# Patient Record
Sex: FEMALE | Race: WHITE | Employment: STUDENT | URBAN - METROPOLITAN AREA
[De-identification: names, ages, dates, MRNs, and addresses within clinical notes are randomized per-mention and may not be internally consistent; named-entity substitution may affect disease eponyms.]

---
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
## 2020-01-06 ENCOUNTER — OFFICE VISIT (OUTPATIENT)
Dept: FAMILY MEDICINE CLINIC | Age: 20
End: 2020-01-06
Payer: MEDICARE

## 2020-01-06 VITALS
HEIGHT: 61 IN | BODY MASS INDEX: 42.1 KG/M2 | DIASTOLIC BLOOD PRESSURE: 88 MMHG | SYSTOLIC BLOOD PRESSURE: 138 MMHG | HEART RATE: 80 BPM | OXYGEN SATURATION: 100 % | WEIGHT: 223 LBS

## 2020-01-06 PROCEDURE — G8427 DOCREV CUR MEDS BY ELIG CLIN: HCPCS | Performed by: FAMILY MEDICINE

## 2020-01-06 PROCEDURE — 1036F TOBACCO NON-USER: CPT | Performed by: FAMILY MEDICINE

## 2020-01-06 PROCEDURE — G8417 CALC BMI ABV UP PARAM F/U: HCPCS | Performed by: FAMILY MEDICINE

## 2020-01-06 PROCEDURE — 99213 OFFICE O/P EST LOW 20 MIN: CPT | Performed by: FAMILY MEDICINE

## 2020-01-06 PROCEDURE — G8484 FLU IMMUNIZE NO ADMIN: HCPCS | Performed by: FAMILY MEDICINE

## 2020-01-06 ASSESSMENT — ENCOUNTER SYMPTOMS
RHINORRHEA: 0
CONSTIPATION: 0
CHEST TIGHTNESS: 0
WHEEZING: 0
NAUSEA: 0
ABDOMINAL DISTENTION: 0
SINUS PRESSURE: 0
PHOTOPHOBIA: 0
ABDOMINAL PAIN: 0
SHORTNESS OF BREATH: 0

## 2020-01-06 ASSESSMENT — PATIENT HEALTH QUESTIONNAIRE - PHQ9
SUM OF ALL RESPONSES TO PHQ QUESTIONS 1-9: 0
2. FEELING DOWN, DEPRESSED OR HOPELESS: 0
SUM OF ALL RESPONSES TO PHQ QUESTIONS 1-9: 0
1. LITTLE INTEREST OR PLEASURE IN DOING THINGS: 0
SUM OF ALL RESPONSES TO PHQ9 QUESTIONS 1 & 2: 0

## 2020-01-06 NOTE — PROGRESS NOTES
certain contexts, culture may be required to meet applicable laws and regulations for   diagnosis of C. trachomatis and N. gonorrhoeae infections. Per 2014  CDC recommendations, this test does not include confirmation of positive results   by an alternative nucleic acid target.            Most recent labs reviewed:     Lab Results   Component Value Date    WBC 9.0 07/17/2019    HGB 13.0 07/17/2019    HCT 39.7 07/17/2019    MCV 82.7 07/17/2019     (H) 07/17/2019       @BRIEFLAB(NA,K,CL,CO2,BUN,CREATININE,GLUCOSE,CALCIUM)@     Lab Results   Component Value Date    ALT 22 07/17/2019    AST 19 07/17/2019    ALKPHOS 75 07/17/2019    BILITOT 0.32 07/17/2019       Lab Results   Component Value Date    TSH 1.24 07/17/2019       Lab Results   Component Value Date    CHOL 156 07/17/2019    CHOL 179 06/25/2013     Lab Results   Component Value Date    TRIG 46 07/17/2019    TRIG 55 06/25/2013     Lab Results   Component Value Date    HDL 52 07/17/2019    HDL 69 06/25/2013     Lab Results   Component Value Date    LDLCHOLESTEROL 95 07/17/2019    LDLCHOLESTEROL 99 06/25/2013     Lab Results   Component Value Date    VLDL NOT REPORTED 07/17/2019    VLDL NOT REPORTED 06/25/2013     Lab Results   Component Value Date    CHOLHDLRATIO 3.0 07/17/2019    CHOLHDLRATIO 2.6 06/25/2013       No results found for: LABA1C    Lab Results   Component Value Date    [id number] 344 10/17/2017       Lab Results   Component Value Date    FOLATE 17.0 10/17/2017       Lab Results   Component Value Date    FERRITIN 62 10/17/2017    FERRITIN 63 10/17/2017       Lab Results   Component Value Date    VITD25 19.3 (L) 10/17/2017             Current Outpatient Medications   Medication Sig Dispense Refill    magnesium oxide (MAG-OX) 250 MG TABS tablet Take 1 tablet by mouth every evening 30 tablet 6    Turmeric 500 MG TABS Take 450 mg by mouth daily 30 tablet 6    coenzyme Q10 100 MG CAPS capsule Take 2 capsules by mouth every morning 60 capsule 6  topiramate ER (TROKENDI XR) 100 MG CP24 Take 1 capsule by mouth daily 30 capsule 6    Vitamin D (CHOLECALCIFEROL) 25 MCG (1000 UT) TABS tablet Take 1 tablet by mouth daily 30 tablet 6    FLUoxetine (PROZAC) 20 MG capsule Take 1 capsule by mouth daily 60 capsule 2    montelukast (SINGULAIR) 10 MG tablet Take 1 tablet by mouth daily 30 tablet 3    vitamin C (ASCORBIC ACID) 500 MG tablet Take 1 tablet by mouth daily 30 tablet 5    albuterol sulfate HFA (VENTOLIN HFA) 108 (90 Base) MCG/ACT inhaler Inhale 2 puffs into the lungs every 6 hours as needed for Wheezing . Take two prophylactic puffs 5-15 min prior to exercise. 1 Inhaler 1    ondansetron (ZOFRAN) 4 MG tablet TAKE 1 TABLET BY MOUTH DAILY AS NEEDED FOR NAUSEA OR VOMITING 30 tablet 0    cyproheptadine (PERIACTIN) 4 MG tablet TAKE 1 TABLET BY MOUTH EVERY NIGHT 30 tablet 0    fluticasone (FLONASE) 50 MCG/ACT nasal spray SHAKE LIQUID AND USE 2 SPRAYS IN EACH NOSTRIL EVERY DAY 1 Bottle 5    fludrocortisone (FLORINEF) 0.1 MG tablet Take 0.1 mg by mouth      ibuprofen (ADVIL;MOTRIN) 600 MG tablet Take 1 tablet by mouth every 8 hours as needed for Pain 100 tablet 1    Multiple Vitamins-Minerals (CENTROVITE) TABS Take 1 tablet by mouth daily 30 tablet 11     No current facility-administered medications for this visit.               Social History     Socioeconomic History    Marital status: Single     Spouse name: Not on file    Number of children: Not on file    Years of education: Not on file    Highest education level: Not on file   Occupational History    Not on file   Social Needs    Financial resource strain: Not on file    Food insecurity:     Worry: Not on file     Inability: Not on file    Transportation needs:     Medical: Not on file     Non-medical: Not on file   Tobacco Use    Smoking status: Never Smoker    Smokeless tobacco: Never Used   Substance and Sexual Activity    Alcohol use: No    Drug use: No    Sexual activity: Not Currently   Lifestyle    Physical activity:     Days per week: Not on file     Minutes per session: Not on file    Stress: Not on file   Relationships    Social connections:     Talks on phone: Not on file     Gets together: Not on file     Attends [employer] service: Not on file     Active member of club or organization: Not on file     Attends meetings of clubs or organizations: Not on file     Relationship status: Not on file    Intimate partner violence:     Fear of current or ex partner: Not on file     Emotionally abused: Not on file     Physically abused: Not on file     Forced sexual activity: Not on file   Other Topics Concern    Not on file   Social History Narrative    Not on file     Counseling given: No        Family History   Problem Relation Age of Onset    Asthma Sister     Diabetes Maternal Grandmother     High Blood Pressure Maternal Grandmother     Heart Disease Maternal Grandmother     Asthma Maternal Grandmother     Other Maternal Grandmother         GERSON with CPAP    Asthma Brother              -rest of complaints with corresponding details per ROS    The patient's past medical, surgical, social, and family history as well as her current medications and allergies were reviewed as documented intoday's encounter. Review of Systems   Constitutional: Negative for activity change, appetite change, fever and unexpected weight change. HENT: Negative for congestion, ear pain, postnasal drip, rhinorrhea and sinus pressure. Eyes: Negative for photophobia and visual disturbance. Respiratory: Negative for chest tightness, shortness of breath and wheezing. Cardiovascular: Negative for chest pain, palpitations and leg swelling. Gastrointestinal: Negative for abdominal distention, abdominal pain, constipation and nausea. Endocrine: Negative for polyphagia and polyuria. Genitourinary: Negative for difficulty urinating and frequency. Neurological: Negative for weakness. Psychiatric/Behavioral: Negative for agitation, behavioral problems and decreased concentration. The patient is not nervous/anxious. Physical Exam    PHYSICAL EXAM:   VITALS:   Vitals:    01/06/20 0820   BP: 138/88   Pulse: 80   SpO2: 100%     GENERAL:  Patient is a well-developed, well-nourished female  in no acute distress, alert and oriented x3, appropriate and pleasant conversation. HEAD: Normocephalic, atraumatic. EYES: Pupils equal, round and reactive to light and accommodation, extraocular   movements intact. ENT: Moist mucous membranes. No erythema is noted. NECK: Supple. No masses. No lymphadenopathy. CARDIOVASCULAR: Regular rate and rhythm. PULMONARY: Lungs are clear to auscultation bilaterally. ABDOMEN: Soft, nontender, nondistended. Positive bowel sounds. MUSCULOSKELETAL: Strength 5/5 bilaterally in all extremities. No tenderness to   palpation of the ribs, long bones, or spine. NEUROLOGIC: Cranial nerves II through XII grossly intact. No focal deficits are noted. ASSESSMENT AND PLAN      1. Neurocardiogenic syncope  -Stable follows with cardiologist  2. Anxiety  Stable continue Prozac    3. Nonintractable episodic headache, unspecified headache type  Stable continue to follow with neurologist    4. Class 2 obesity due to excess calories without serious comorbidity with body mass index (BMI) of 37.0 to 37.9 in adult  Continue to work on diet and exercise. No orders of the defined types were placed in this encounter. There are no discontinued medications. Romario Salas received counseling on the following healthy behaviors: nutrition, exercise and medication adherence  Reviewed prior labs and health maintenance  Continue current medications, diet and exercise. Discussed use, benefit, and side effects of prescribed medications. Barriers to medication compliance addressed.    Patient given educational materials - see patient instructions  Was a self-tracking

## 2020-01-06 NOTE — PROGRESS NOTES
Visit Information    Have you changed or started any medications since your last visit including any over-the-counter medicines, vitamins, or herbal medicines? no   Have you stopped taking any of your medications? Is so, why? -  no  Are you having any side effects from any of your medications? - no    Have you seen any other physician or provider since your last visit?  no   Have you had any other diagnostic tests since your last visit?  no   Have you been seen in the emergency room and/or had an admission in a hospital since we last saw you?  no   Have you had your routine dental cleaning in the past 6 months?  no     Do you have an active MyChart account? If no, what is the barrier?   Yes    Patient Care Team:  Alli Dumont MD as PCP - General (Family Medicine)  Alli Dumont MD as PCP - [facility]    Medical History Review  Past Medical, Family, and Social History reviewed and does contribute to the patient presenting condition    Health Maintenance   Topic Date Due    Chlamydia screen  07/17/2020    DTaP/Tdap/Td vaccine (7 - Td) 08/03/2020    HPV vaccine  Completed    Varicella Vaccine  Completed    Meningococcal (ACWY) Vaccine  Completed    Flu vaccine  Completed    Pneumococcal 0-64 years Vaccine  Completed    HIV screen  Completed

## 2020-07-27 ENCOUNTER — HOSPITAL ENCOUNTER (OUTPATIENT)
Age: 20
Setting detail: SPECIMEN
Discharge: HOME | End: 2020-07-27
Payer: MEDICARE

## 2020-07-27 ENCOUNTER — OFFICE VISIT (OUTPATIENT)
Dept: OBGYN CLINIC | Age: 20
End: 2020-07-27
Payer: MEDICARE

## 2020-07-27 VITALS
HEART RATE: 87 BPM | DIASTOLIC BLOOD PRESSURE: 90 MMHG | HEIGHT: 61 IN | SYSTOLIC BLOOD PRESSURE: 125 MMHG | TEMPERATURE: 98.3 F | WEIGHT: 233.6 LBS | BODY MASS INDEX: 44.1 KG/M2 | RESPIRATION RATE: 16 BRPM

## 2020-07-27 LAB
DIRECT EXAM: ABNORMAL
Lab: ABNORMAL
SPECIMEN DESCRIPTION: ABNORMAL

## 2020-07-27 PROCEDURE — 1036F TOBACCO NON-USER: CPT | Performed by: CLINICAL NURSE SPECIALIST

## 2020-07-27 PROCEDURE — 99203 OFFICE O/P NEW LOW 30 MIN: CPT | Performed by: CLINICAL NURSE SPECIALIST

## 2020-07-27 PROCEDURE — G8427 DOCREV CUR MEDS BY ELIG CLIN: HCPCS | Performed by: CLINICAL NURSE SPECIALIST

## 2020-07-27 PROCEDURE — G8417 CALC BMI ABV UP PARAM F/U: HCPCS | Performed by: CLINICAL NURSE SPECIALIST

## 2020-07-27 ASSESSMENT — ENCOUNTER SYMPTOMS
ALLERGIC/IMMUNOLOGIC NEGATIVE: 1
RESPIRATORY NEGATIVE: 1
EYES NEGATIVE: 1
GASTROINTESTINAL NEGATIVE: 1

## 2020-07-27 NOTE — PROGRESS NOTES
Subjective:      Patient ID:  Kerline Tompkins is a 21 y.o. female who presents for   Chief Complaint   Patient presents with    Pelvic Pain     Patient reports right lower quadrant pain that is stabbing and occurs at random times       HPI     New patient is a 20 yo female who presents for pelvic pain that has been ongoing for several years. Patient reports that the pelvic pain has worsened over the past 1 month described sharp stabbing and intermittent. Patient also reports that she has irregular menses and she has not had a cycle for the past 45 days. Patient states that she can skip up to 3 months at a time which has been her pattern since she began menarche. Patient reports that when she does have a menses the flow is described as moderate, and states she rarely has cramping with menses. Review of Systems   Constitutional: Negative for chills and fever. HENT: Negative. Eyes: Negative. Respiratory: Negative. Cardiovascular: Negative. Gastrointestinal: Negative. Endocrine: Negative. Genitourinary: Positive for menstrual problem (reports that she skips menses and sometimes as long as 115 days) and pelvic pain (for the past several years and over the last month it has worserened described as sharp and stabbing). Negative for dysuria and vaginal discharge. Musculoskeletal: Negative. Skin: Negative. Allergic/Immunologic: Negative. Neurological: Negative. Hematological: Negative. Psychiatric/Behavioral: Negative. BP (!) 125/90 (Site: Right Upper Arm, Position: Sitting)   Pulse 87   Temp 98.3 °F (36.8 °C) (Temporal)   Resp 16   Ht 5' 1\" (1.549 m)   Wt 233 lb 9.6 oz (106 kg)   LMP 05/09/2020 (Exact Date)   BMI 44.14 kg/m²    Patient's last menstrual period was 05/09/2020 (exact date). Objective:   Physical Exam  Vitals signs reviewed. Constitutional:       Appearance: She is well-developed. HENT:      Head: Normocephalic and atraumatic.    Eyes: Conjunctiva/sclera: Conjunctivae normal.   Neck:      Musculoskeletal: Normal range of motion and neck supple. Cardiovascular:      Rate and Rhythm: Normal rate and regular rhythm. Pulmonary:      Effort: Pulmonary effort is normal.      Breath sounds: Normal breath sounds. Abdominal:      General: Bowel sounds are normal.   Genitourinary:     Vagina: Vaginal discharge (white discharge) present. Comments: Discomfort with bimanual exam on the right  Musculoskeletal: Normal range of motion. Skin:     General: Skin is warm and dry. Neurological:      Mental Status: She is oriented to person, place, and time. Psychiatric:         Behavior: Behavior normal.         Thought Content: Thought content normal.         Judgment: Judgment normal.         Assessment:      Diagnosis Orders   1. Irregular menses  Glucose, Fasting    Insulin, total    Follicle Stimulating Hormone    Luteinizing Hormone    TSH    Prolactin    T4, Free   2. Pelvic pain in female  US NON OB TRANSVAGINAL    VAGINITIS DNA PROBE   3. Birth control counseling             Plan:    discussed with patient birth control options and recommended that she follow up with her neurologist she is unclear of her migraine status with or without Aura. Suspect PCO     Return in about 2 weeks (around 8/10/2020) for after all labs and US are complete. Patient was seen with total face to face time of 30 minutes. More than 50% of this visit was on counseling andeducation regarding the problems listed below and her options. She was also counseled on her preventative health maintenance recommendations and follow-up.     Electronically signed by: Soledad Zamora CNP

## 2020-07-28 RX ORDER — METRONIDAZOLE 500 MG/1
500 TABLET ORAL 2 TIMES DAILY
Qty: 14 TABLET | Refills: 0 | Status: SHIPPED | OUTPATIENT
Start: 2020-07-28 | End: 2020-08-04

## 2020-07-28 RX ORDER — FLUCONAZOLE 100 MG/1
100 TABLET ORAL DAILY
Qty: 7 TABLET | Refills: 0 | Status: SHIPPED | OUTPATIENT
Start: 2020-07-28 | End: 2020-08-04

## 2020-07-29 ENCOUNTER — TELEMEDICINE (OUTPATIENT)
Dept: FAMILY MEDICINE CLINIC | Age: 20
End: 2020-07-29
Payer: MEDICARE

## 2020-07-29 PROBLEM — E66.01 MORBIDLY OBESE (HCC): Status: ACTIVE | Noted: 2019-07-17

## 2020-07-29 PROCEDURE — 99213 OFFICE O/P EST LOW 20 MIN: CPT | Performed by: FAMILY MEDICINE

## 2020-07-29 PROCEDURE — G8427 DOCREV CUR MEDS BY ELIG CLIN: HCPCS | Performed by: FAMILY MEDICINE

## 2020-07-29 ASSESSMENT — ENCOUNTER SYMPTOMS
BACK PAIN: 0
CONSTIPATION: 0
BLOOD IN STOOL: 0
RHINORRHEA: 0
ABDOMINAL PAIN: 0
COUGH: 0
WHEEZING: 0
SHORTNESS OF BREATH: 0

## 2020-07-29 NOTE — PROGRESS NOTES
2020    TELEHEALTH EVALUATION -- Audio/Visual (During UWLTN-23 public health emergency)    HPI: Patient is here for follow-up for obesity and anxiety. Patient is on Prozac reports anxiety stable. Patient wants to see psychiatrist for ADHD, reports she had symptoms of decreased focusing, not able to concentrate, and her mind always goes from one thing to another. Patient wants to get evaluated for ADHD. Reports she had this problem since birth but never was seen by psychiatrist.      Obesity patient has gained couple of pounds because she is not working out. Patient is asking to do covid   Test as she has to go back to college. Headaches stable follows with neurologist  Sam Escalante (:  2000) has requested an audio/video evaluation for the following concern(s):    Chief Complaint   Patient presents with    Anxiety         Review of Systems   Constitutional: Positive for unexpected weight change. Negative for activity change, appetite change and fatigue. HENT: Negative for congestion, postnasal drip and rhinorrhea. Eyes: Negative for visual disturbance. Respiratory: Negative for cough, shortness of breath and wheezing. Cardiovascular: Negative for chest pain, palpitations and leg swelling. Gastrointestinal: Negative for abdominal pain, blood in stool and constipation. Genitourinary: Positive for menstrual problem. Negative for difficulty urinating, flank pain, urgency and vaginal pain. Musculoskeletal: Negative for arthralgias, back pain, gait problem and myalgias. Neurological: Negative for speech difficulty, weakness, light-headedness and numbness. Psychiatric/Behavioral: Positive for behavioral problems, decreased concentration and dysphoric mood. Negative for confusion, sleep disturbance and suicidal ideas. The patient is nervous/anxious and is hyperactive. Prior to Visit Medications    Medication Sig Taking?  Authorizing Provider   metroNIDAZOLE (FLAGYL) 500 MG tablet Take 1 tablet by mouth 2 times daily for 7 days Yes LAURO Meek CNP   fluconazole (DIFLUCAN) 100 MG tablet Take 1 tablet by mouth daily for 7 days Yes LAURO Meek CNP   magnesium oxide (MAG-OX) 250 MG TABS tablet Take 1 tablet by mouth every evening Yes Patricia Navas MD   Turmeric 500 MG TABS Take 450 mg by mouth daily Yes Patricia Navas MD   coenzyme Q10 100 MG CAPS capsule Take 2 capsules by mouth every morning Yes Patricia Navas MD   topiramate ER (TROKENDI XR) 100 MG CP24 Take 1 capsule by mouth daily Yes Patricia Navas MD   Vitamin D (CHOLECALCIFEROL) 25 MCG (1000 UT) TABS tablet Take 1 tablet by mouth daily Yes Patricia Navas MD   FLUoxetine (PROZAC) 20 MG capsule Take 1 capsule by mouth daily Yes Wale Porras MD   montelukast (SINGULAIR) 10 MG tablet Take 1 tablet by mouth daily Yes Arthur Garg MD   vitamin C (ASCORBIC ACID) 500 MG tablet Take 1 tablet by mouth daily Yes Patricia Navas MD   albuterol sulfate HFA (VENTOLIN HFA) 108 (90 Base) MCG/ACT inhaler Inhale 2 puffs into the lungs every 6 hours as needed for Wheezing . Take two prophylactic puffs 5-15 min prior to exercise.  Yes Sophy Obrien MD   ondansetron (ZOFRAN) 4 MG tablet TAKE 1 TABLET BY MOUTH DAILY AS NEEDED FOR NAUSEA OR VOMITING Yes Sophy Obrien MD   cyproheptadine (PERIACTIN) 4 MG tablet TAKE 1 TABLET BY MOUTH EVERY NIGHT Yes Sophy Obrien MD   fluticasone (FLONASE) 50 MCG/ACT nasal spray SHAKE LIQUID AND USE 2 SPRAYS IN EACH NOSTRIL EVERY DAY Yes Sophy Obrien MD   fludrocortisone (FLORINEF) 0.1 MG tablet Take 0.1 mg by mouth Yes Frida Salazar MD   ibuprofen (ADVIL;MOTRIN) 600 MG tablet Take 1 tablet by mouth every 8 hours as needed for Pain Yes Sophy Obrien MD   Multiple Vitamins-Minerals (CENTROVITE) TABS Take 1 tablet by mouth daily  Sophy Obrien MD       Social History     Tobacco Use    Smoking status: Never Smoker    Smokeless tobacco: Never Used Substance Use Topics    Alcohol use: No    Drug use: No        Allergies   Allergen Reactions    Triamterene Itching     Redness     ,   Past Medical History:   Diagnosis Date    Anxiety 7/17/2019    Class 2 obesity due to excess calories without serious comorbidity with body mass index (BMI) of 37.0 to 37.9 in adult 7/17/2019    Headache(784.0)     Meniere syndrome     Mild intermittent asthma 8/17/2017    Vision loss 3/29/2016   ,   Past Surgical History:   Procedure Laterality Date    TYMPANOSTOMY TUBE PLACEMENT Bilateral 8/13   ,   Social History     Tobacco Use    Smoking status: Never Smoker    Smokeless tobacco: Never Used   Substance Use Topics    Alcohol use: No    Drug use: No   ,   Family History   Problem Relation Age of Onset    Asthma Sister     Diabetes Maternal Grandmother     High Blood Pressure Maternal Grandmother     Heart Disease Maternal Grandmother     Asthma Maternal Grandmother     Other Maternal Grandmother         GERSON with CPAP    Asthma Brother    ,   Immunization History   Administered Date(s) Administered    DTaP 2000, 2000, 2000, 05/21/2001, 04/01/2004    HPV Quadrivalent (Gardasil) 06/25/2013, 12/26/2013, 07/25/2014, 10/24/2014, 03/16/2015    Hepatitis A 08/28/2007, 11/12/2008    Hepatitis B 2000, 2000, 05/24/2001    Hepatitis B (Recombivax HB) 07/24/2015    Hib PRP-OMP (PedvaxHIB) 2000, 04/01/2004    Hib, unspecified 2000, 2000, 05/24/2001, 04/01/2004    Influenza Virus Vaccine 10/24/2002, 11/12/2008, 10/08/2009, 12/03/2010, 12/26/2013, 10/24/2014, 12/21/2015, 08/13/2019    Influenza, Quadv, IM, PF (6 mo and older Fluzone, Flulaval, Fluarix, and 3 yrs and older Afluria) 12/02/2016, 11/20/2017    MMR 05/24/2001, 10/26/2004    Meningococcal B, OMV (Bexsero) 11/20/2017, 01/26/2018    Meningococcal MCV4P (Menactra) 07/25/2014, 07/22/2016    Pneumococcal Conjugate 13-valent (Lorence Guard) 07/22/2016    Pneumococcal Conjugate 7-valent (Prevnar7) 08/30/2001, 10/26/2004    Pneumococcal Polysaccharide (Ubylxycwi00) 12/02/2016    Polio IPV (IPOL) 2000, 2000, 2000, 04/01/2004    Tdap (Boostrix, Adacel) 08/03/2010    Varicella (Varivax) 05/24/2001, 05/24/2002, 08/28/2007   ,   Health Maintenance   Topic Date Due    Chlamydia screen  07/17/2020    DTaP/Tdap/Td vaccine (7 - Td) 08/03/2020    Flu vaccine (1) 09/01/2020    Hepatitis A vaccine  Completed    Hepatitis B vaccine  Completed    Hib vaccine  Completed    HPV vaccine  Completed    Varicella vaccine  Completed    Meningococcal (ACWY) vaccine  Completed    Pneumococcal 0-64 years Vaccine  Completed    HIV screen  Completed       PHYSICAL EXAMINATION:  [ INSTRUCTIONS:  \"[x]\" Indicates a positive item  \"[]\" Indicates a negative item  -- DELETE ALL ITEMS NOT EXAMINED]  Vital Signs: (As obtained by patient/caregiver or practitioner observation)    Blood pressure-  Heart rate-    Respiratory rate-    Temperature-  Pulse oximetry-     Constitutional: [x] Appears well-developed and well-nourished [x] No apparent distress      [] Abnormal-   Mental status  [x] Alert and awake  [x] Oriented to person/place/time [x]Able to follow commands      Eyes:  EOM    [x]  Normal  [] Abnormal-  Sclera  [x]  Normal  [] Abnormal -         Discharge [x]  None visible  [] Abnormal -    HENT:   [x] Normocephalic, atraumatic.   [x] Abnormal   [x] Mouth/Throat: Mucous membranes are moist.     External Ears [x] Normal  [] Abnormal-     Neck: [x] No visualized mass     Pulmonary/Chest: [x] Respiratory effort normal.  [x] No visualized signs of difficulty breathing or respiratory distress        [] Abnormal-      Musculoskeletal:   [x] Normal gait with no signs of ataxia         [x] Normal range of motion of neck        [] Abnormal-       Neurological:        [x] No Facial Asymmetry (Cranial nerve 7 motor function) (limited exam to video visit)          [x] No gaze palsy        [] Abnormal-         Skin:        [x] No significant exanthematous lesions or discoloration noted on facial skin         [] Abnormal-            Psychiatric:       [] Normal Affect [] No Hallucinations        [x] Abnormal-patient difficulty in concentrating and is anxious. Other pertinent observable physical exam findings-     ASSESSMENT/PLAN:  1. Mild intermittent asthma without complication  Stable no issues since last appointment    2. Menstrual periods irregular  seen by gynecologist has blood work ordered    3. Anxiety  Referral placed to get evaluated for ADHD  - Ambulatory referral to Psychiatry    4. Chronic intractable headache, unspecified headache type  Stable follows with neurologist    6. Morbidly obese ([facility] [address]  Discussed with patient work on diet and exercise    7. Screening for chlamydial disease    - Chlamydia, DNA, Urine; Future      Return in about 6 months (around 1/29/2021). Luis Yanez is a 21 y.o. female being evaluated by a Virtual Visit (video visit) encounter to address concerns as mentioned above. A caregiver was present when appropriate. Due to this being a TeleHealth encounter (During [facility] public [facility] emergency), evaluation of the following organ systems was limited: Vitals/Constitutional/EENT/Resp/CV/GI//MS/Neuro/Skin/Heme-Lymph-Imm. Pursuant to the emergency declaration under the [address] [place] [address], [address] [place] [address] authority and the rumr: turn off the lights and [employer] Act, this Virtual Visit was conducted with patient's (and/or legal guardian's) consent, to reduce the patient's risk of exposure to COVID-19 and provide necessary medical care. The patient (and/or legal guardian) has also been advised to contact this office for worsening conditions or problems, and seek emergency medical treatment and/or call 911 if deemed necessary.      Patient identification was verified at the start of the visit: Yes    Total time spent on this encounter: 15    Services were provided through a video synchronous discussion virtually to substitute for in-person clinic visit. Patient and provider were located at their individual homes. --Rosario Pal MD on 7/29/2020 at 9:00 AM    An electronic signature was used to authenticate this note.

## 2020-07-31 ENCOUNTER — HOSPITAL ENCOUNTER (OUTPATIENT)
Age: 20
Setting detail: SPECIMEN
Discharge: HOME | End: 2020-07-31
Payer: MEDICARE

## 2020-07-31 LAB
FOLLICLE STIMULATING HORMONE: 4.5 U/L (ref 1.7–21.5)
GLUCOSE FASTING: 96 MG/DL (ref 70–99)
INSULIN COMMENT: NORMAL
INSULIN REFERENCE RANGE:: NORMAL
INSULIN: 24.6 MU/L
LH: 6.6 U/L (ref 1–95.6)
PROLACTIN: 21.93 UG/L (ref 4.79–23.3)
THYROXINE, FREE: 1.23 NG/DL (ref 0.93–1.7)
TSH SERPL DL<=0.05 MIU/L-ACNC: 1 MIU/L (ref 0.3–5)

## 2020-07-31 PROCEDURE — 84439 ASSAY OF FREE THYROXINE: CPT

## 2020-07-31 PROCEDURE — 83001 ASSAY OF GONADOTROPIN (FSH): CPT

## 2020-07-31 PROCEDURE — 83002 ASSAY OF GONADOTROPIN (LH): CPT

## 2020-07-31 PROCEDURE — 36415 COLL VENOUS BLD VENIPUNCTURE: CPT

## 2020-07-31 PROCEDURE — 82947 ASSAY GLUCOSE BLOOD QUANT: CPT

## 2020-07-31 PROCEDURE — 84443 ASSAY THYROID STIM HORMONE: CPT

## 2020-07-31 PROCEDURE — 84146 ASSAY OF PROLACTIN: CPT

## 2020-07-31 PROCEDURE — 83525 ASSAY OF INSULIN: CPT

## 2020-08-06 ENCOUNTER — OFFICE VISIT (OUTPATIENT)
Dept: OBGYN CLINIC | Age: 20
End: 2020-08-06
Payer: MEDICARE

## 2020-08-06 VITALS
SYSTOLIC BLOOD PRESSURE: 128 MMHG | WEIGHT: 232 LBS | HEART RATE: 88 BPM | DIASTOLIC BLOOD PRESSURE: 88 MMHG | TEMPERATURE: 99.5 F | HEIGHT: 61 IN | BODY MASS INDEX: 43.8 KG/M2

## 2020-08-06 PROCEDURE — G8417 CALC BMI ABV UP PARAM F/U: HCPCS | Performed by: SPECIALIST

## 2020-08-06 PROCEDURE — 1036F TOBACCO NON-USER: CPT | Performed by: SPECIALIST

## 2020-08-06 PROCEDURE — G8427 DOCREV CUR MEDS BY ELIG CLIN: HCPCS | Performed by: SPECIALIST

## 2020-08-06 PROCEDURE — 99212 OFFICE O/P EST SF 10 MIN: CPT | Performed by: SPECIALIST

## 2020-08-06 RX ORDER — LORATADINE 10 MG/1
CAPSULE, LIQUID FILLED ORAL
COMMUNITY
End: 2022-08-02

## 2020-08-06 RX ORDER — TOPIRAMATE 100 MG/1
TABLET, FILM COATED ORAL
COMMUNITY
End: 2020-08-06

## 2020-08-06 RX ORDER — MECLIZINE HCL 12.5 MG/1
12.5 TABLET ORAL AS NEEDED
COMMUNITY

## 2020-08-06 RX ORDER — IBUPROFEN 800 MG/1
800 TABLET ORAL EVERY 6 HOURS PRN
Qty: 28 TABLET | Refills: 2 | Status: SHIPPED | OUTPATIENT
Start: 2020-08-06

## 2020-08-06 RX ORDER — ALBUTEROL SULFATE 90 UG/1
AEROSOL, METERED RESPIRATORY (INHALATION)
COMMUNITY
End: 2022-08-02 | Stop reason: SDUPTHER

## 2020-08-06 RX ORDER — NORETHINDRONE ACETATE AND ETHINYL ESTRADIOL 1.5-30(21)
1 KIT ORAL DAILY
Qty: 1 PACKET | Refills: 3 | Status: SHIPPED | OUTPATIENT
Start: 2020-08-06 | End: 2020-10-09 | Stop reason: SDUPTHER

## 2020-08-06 ASSESSMENT — ENCOUNTER SYMPTOMS
VOMITING: 0
DIARRHEA: 0
ABDOMINAL DISTENTION: 0
APNEA: 0
CONSTIPATION: 0
ABDOMINAL PAIN: 0
NAUSEA: 0
EYE PAIN: 0
COUGH: 0

## 2020-08-06 NOTE — PROGRESS NOTES
Subjective:      Patient ID: Melissa Hall is a 21 y.o. female. Chief Complaint   Patient presents with    Results     Patient is here today for ultrasound results     /88 (Site: Left Upper Arm, Position: Sitting, Cuff Size: Large Adult)   Pulse 88   Temp 99.5 °F (37.5 °C) (Temporal)   Ht 5' 1\" (1.549 m)   Wt 232 lb (105.2 kg)   LMP 07/31/2020 (Exact Date)   BMI 43.84 kg/m²   Patient's last menstrual period was 07/31/2020 (exact date). No obstetric history on file.     Past Medical History:   Diagnosis Date    Anxiety 7/17/2019    Class 2 obesity due to excess calories without serious comorbidity with body mass index (BMI) of 37.0 to 37.9 in adult 7/17/2019    Headache(784.0)     Meniere syndrome     Mild intermittent asthma 8/17/2017    Vision loss 3/29/2016     Current Outpatient Medications Ordered in Epic   Medication Sig Dispense Refill    albuterol sulfate  (90 Base) MCG/ACT inhaler 2 puffs as needed      loratadine (CLARITIN) 10 MG capsule 1 tablet      meclizine (ANTIVERT) 12.5 MG tablet       norethindrone-ethinyl estradiol-iron (LOESTRIN FE 1.5/30) 1.5-30 MG-MCG tablet Take 1 tablet by mouth daily 1 packet 3    ibuprofen (ADVIL;MOTRIN) 800 MG tablet Take 1 tablet by mouth every 6 hours as needed for Pain 28 tablet 2    magnesium oxide (MAG-OX) 250 MG TABS tablet Take 1 tablet by mouth every evening 30 tablet 6    Turmeric 500 MG TABS Take 450 mg by mouth daily 30 tablet 6    coenzyme Q10 100 MG CAPS capsule Take 2 capsules by mouth every morning 60 capsule 6    topiramate ER (TROKENDI XR) 100 MG CP24 Take 1 capsule by mouth daily 30 capsule 6    Vitamin D (CHOLECALCIFEROL) 25 MCG (1000 UT) TABS tablet Take 1 tablet by mouth daily 30 tablet 6    FLUoxetine (PROZAC) 20 MG capsule Take 1 capsule by mouth daily 60 capsule 2    montelukast (SINGULAIR) 10 MG tablet Take 1 tablet by mouth daily 30 tablet 3    vitamin C (ASCORBIC ACID) 500 MG tablet Take 1 tablet by mouth daily 30 tablet 5    albuterol sulfate HFA (VENTOLIN HFA) 108 (90 Base) MCG/ACT inhaler Inhale 2 puffs into the lungs every 6 hours as needed for Wheezing . Take two prophylactic puffs 5-15 min prior to exercise. 1 Inhaler 1    ondansetron (ZOFRAN) 4 MG tablet TAKE 1 TABLET BY MOUTH DAILY AS NEEDED FOR NAUSEA OR VOMITING 30 tablet 0    cyproheptadine (PERIACTIN) 4 MG tablet TAKE 1 TABLET BY MOUTH EVERY NIGHT 30 tablet 0    fluticasone (FLONASE) 50 MCG/ACT nasal spray SHAKE LIQUID AND USE 2 SPRAYS IN EACH NOSTRIL EVERY DAY 1 Bottle 5    fludrocortisone (FLORINEF) 0.1 MG tablet Take 0.1 mg by mouth      ibuprofen (ADVIL;MOTRIN) 600 MG tablet Take 1 tablet by mouth every 8 hours as needed for Pain 100 tablet 1    Multiple Vitamins-Minerals (CENTROVITE) TABS Take 1 tablet by mouth daily 30 tablet 11     No current Psychiatric-ordered facility-administered medications on file. Problem List Items Addressed This Visit     None      Visit Diagnoses     Pelvic pain in female    -  Primary    Irregular bleeding            Allergies   Allergen Reactions    Seasonal Other (See Comments)    Triamterene Itching     Redness       No orders of the defined types were placed in this encounter. Patient is here today to discuss the result of her ultrasound. She has been having pelvic pain and irregular bleeding. The pain comes and goes for the last year. It has gotten worse over the year. The pain was really bad 2 weeks ago. She is not using any birth control. She denies nausea, vomiting and fever. Review of Systems   Constitutional: Negative for activity change, appetite change and fever. HENT: Negative for ear discharge and ear pain. Eyes: Negative for pain and visual disturbance. Respiratory: Negative for apnea and cough. Cardiovascular: Negative for chest pain, palpitations and leg swelling.    Gastrointestinal: Negative for abdominal distention, abdominal pain, constipation, diarrhea, nausea and vomiting. Endocrine: Negative. Genitourinary: Positive for menstrual problem and pelvic pain. Negative for difficulty urinating and dysuria. Musculoskeletal: Negative for neck pain and neck stiffness. Skin: Negative. Neurological: Negative for light-headedness and numbness. Hematological: Negative. Does not bruise/bleed easily. Objective:   Physical Exam  Vitals signs and nursing note reviewed. Exam conducted with a chaperone present. Constitutional:       Appearance: She is well-developed. HENT:      Head: Normocephalic and atraumatic. Neck:      Thyroid: No thyroid mass or thyromegaly. Cardiovascular:      Rate and Rhythm: Normal rate and regular rhythm. Pulmonary:      Effort: Pulmonary effort is normal.      Breath sounds: Normal breath sounds. Chest:      Breasts:         Right: Normal. No inverted nipple, mass, nipple discharge, skin change or tenderness. Left: Normal. No inverted nipple, mass, nipple discharge, skin change or tenderness. Abdominal:      General: Bowel sounds are normal. There is no distension. Palpations: Abdomen is soft. There is no mass. Tenderness: There is no abdominal tenderness. There is no guarding or rebound. Hernia: There is no hernia in the left inguinal area. Genitourinary:     General: Normal vulva. Exam position: Supine. Labia:         Right: No rash or lesion. Left: No rash or lesion. Vagina: Normal. No signs of injury. No vaginal discharge or tenderness. Cervix: No cervical motion tenderness or discharge. Uterus: Normal. Not enlarged, not fixed and not tender. Adnexa: Right adnexa normal and left adnexa normal.        Right: No mass or tenderness. Left: No mass or tenderness. Rectum: Normal. No mass or anal fissure. Normal anal tone. Musculoskeletal: Normal range of motion. General: No tenderness. Skin:     General: Skin is warm and dry. Neurological:      Mental Status: She is alert and oriented to person, place, and time. Psychiatric:         Judgment: Judgment normal.         Assessment:      Patient with pelvic pain and irregular bleeding. Normal ultrasound was reviewed and discussed with patient. Will treat with birth control pills and Motrin. Patient was advised to monitor her pain and bleeding for 3 months and return. If her symptoms persist, laparoscopy will be considered. Plan:       Orders Placed This Encounter   Medications    norethindrone-ethinyl estradiol-iron (LOESTRIN FE 1.5/30) 1.5-30 MG-MCG tablet     Sig: Take 1 tablet by mouth daily     Dispense:  1 packet     Refill:  3    ibuprofen (ADVIL;MOTRIN) 800 MG tablet     Sig: Take 1 tablet by mouth every 6 hours as needed for Pain     Dispense:  28 tablet     Refill:  2      Appointment in 3 months. Jessika Carter am scribing for, and in the presence of Dr. Khai Angeles. Electronically signed by: Arturo Hernandez 8/6/20 4:48 PM       I agree to the above documentation placed by my scribe Arturo Hernandez. I reviewed the scribe's note and agree with the documented findings and plan of care. Any areas of disagreement are noted on the chart. I have personally evaluated this patient. Additional findings are as noted. I agree with the chief complaint, past medical history, past surgical history, allergies, medications, social and family history as documented unless otherwise noted below.      Electronically signed by Khai Angeles MD on 8/7/2020 at 3:29 AM

## 2020-08-07 ENCOUNTER — TELEMEDICINE (OUTPATIENT)
Dept: PEDIATRIC NEUROLOGY | Age: 20
End: 2020-08-07
Payer: MEDICARE

## 2020-08-07 PROCEDURE — G8427 DOCREV CUR MEDS BY ELIG CLIN: HCPCS | Performed by: PSYCHIATRY & NEUROLOGY

## 2020-08-07 PROCEDURE — 99215 OFFICE O/P EST HI 40 MIN: CPT | Performed by: PSYCHIATRY & NEUROLOGY

## 2020-08-07 RX ORDER — UBIDECARENONE 100 MG
200 CAPSULE ORAL EVERY MORNING
Qty: 60 CAPSULE | Refills: 6 | Status: SHIPPED | OUTPATIENT
Start: 2020-08-07 | End: 2021-01-21 | Stop reason: SDUPTHER

## 2020-08-07 RX ORDER — VITAMIN B COMPLEX
1000 TABLET ORAL DAILY
Qty: 30 TABLET | Refills: 6 | Status: SHIPPED | OUTPATIENT
Start: 2020-08-07 | End: 2021-01-21 | Stop reason: SDUPTHER

## 2020-08-07 RX ORDER — PNV NO.95/FERROUS FUM/FOLIC AC 28MG-0.8MG
250 TABLET ORAL EVERY EVENING
Qty: 30 TABLET | Refills: 6 | Status: SHIPPED | OUTPATIENT
Start: 2020-08-07 | End: 2021-01-21 | Stop reason: SDUPTHER

## 2020-08-07 RX ORDER — TOPIRAMATE 100 MG/1
CAPSULE, EXTENDED RELEASE ORAL
Qty: 30 CAPSULE | Refills: 6 | Status: SHIPPED | OUTPATIENT
Start: 2020-08-07 | End: 2021-01-21 | Stop reason: SDUPTHER

## 2020-08-07 RX ORDER — TOPIRAMATE 25 MG/1
CAPSULE, EXTENDED RELEASE ORAL
Qty: 30 CAPSULE | Refills: 5 | Status: SHIPPED | OUTPATIENT
Start: 2020-08-07 | End: 2021-01-21 | Stop reason: SDUPTHER

## 2020-08-07 RX ORDER — TURMERIC ROOT EXTRACT 500 MG
450 TABLET ORAL DAILY
Qty: 30 TABLET | Refills: 6 | Status: SHIPPED | OUTPATIENT
Start: 2020-08-07 | End: 2021-01-21 | Stop reason: SDUPTHER

## 2020-08-07 NOTE — PROGRESS NOTES
associated tingling in her head  with these migraines. Ibuprofen seems to provide some relief. HEARING LOSS:  Kriss Christensen states that her hearing loss fluctuates. She states she can hear better in the right ear. This continues to remain unchanged. She continues to hear ringing in both ears. It should be recalled she has followed up with ENT in this regard who did not have any further recommendations. Kriss Christensen states that she has had genetic studies and chromosomal microadenoma as well as multiple other tests in the past without any clear etiology. DIZZINESS:   Kriss Christensen states the dizziness continues to occur intermittently. She states the last episode occurred 2 days ago. She states her episodes are a \"wave of dizziness\" lasting approximately 5-10 seconds and will then go away. In the past, she reported the dizziness will occur approximately twice per month at random times and can last up to 20 minutes in duration. No syncopal events reported. She states if she gets up to fast she will feel as if she ill pass out. Kriss Christensen continues to follow with Dr. Alaina Weinberg in this regard. She remains on Florinef in this regard which is also managed by Dr. Alaina Weinberg. REVIEW OF SYSTEMS:  Constitutional: Negative. Eyes: Negative. Respiratory: Negative. Cardiovascular: Negative. Gastrointestinal: Negative. Genitourinary: Negative. Musculoskeletal: Negative    Skin: Negative. Neurological: positive for headaches and dizziness, negative for seizures, negative for developmental delays. Hematological: Negative. Psychiatric/Behavioral: negative for behavioral issues, negative for ADHD     All other systems reviewed and are negative. Past, social, family, and developmental history was reviewed and unchanged.     OBJECTIVE:   PHYSICAL EXAM  LMP 07/31/2020 (Exact Date)      Constitutional: [x] Appears well-developed and well-nourished [x] No apparent distress      [] Abnormal-   Mental status  [x] Alert and awake  [x] Oriented to person/place/time [x]Able to follow commands      Eyes:  EOM    [x]  Normal  [] Abnormal-  Sclera  [x]  Normal  [] Abnormal -         Discharge [x]  None visible  [] Abnormal -    HENT:   [x] Normocephalic, atraumatic. [] Abnormal   [x] Mouth/Throat: Mucous membranes are moist.     External Ears [x] Normal  [] Abnormal-     Neck: [x] No visualized mass     Pulmonary/Chest: [x] Respiratory effort normal.  [x] No visualized signs of difficulty breathing or respiratory distress        [] Abnormal-      Musculoskeletal:   [x] Normal gait with no signs of ataxia         [x] Normal range of motion of neck        [] Abnormal-     Neurological:        [x] No Facial Asymmetry (Cranial nerve 7 motor function) (limited exam to video visit)          [x] No gaze palsy        [] Abnormal-         Skin:        [x] No significant exanthematous lesions or discoloration noted on facial skin         [] Abnormal-            Psychiatric:       [x] Normal Affect [] No Hallucinations        [] Abnormal-     RECORD REVIEW: Previous medical records were reviewed at today's visit. DIAGNOSTIC STUDIES:  10/17/2017 - EEG - Normal   11/4/2017 - MRI Brain - Normal   June 2016 - CBC - NORMAL, electrolytes - normal, TSH- normal, SORAIDA screen - negative, CRP 0.053, ESR- 18  MRI of the brain in October 2011 was reported as normal.  MRI of the brain in June 2013 reported Small focus of T2 hyperintensity Measuring 5 mm in the left centrum semiovale of unexplained etiology. Ref.  Range 7/17/2019 10:05   Sodium Latest Ref Range: 135 - 144 mmol/L 139   Potassium Latest Ref Range: 3.7 - 5.3 mmol/L 4.2   Chloride Latest Ref Range: 98 - 107 mmol/L 102   CO2 Latest Ref Range: 20 - 31 mmol/L 24   BUN Latest Ref Range: 6 - 20 mg/dL 15   Creatinine Latest Ref Range: 0.50 - 0.90 mg/dL 0.67   Anion Gap Latest Ref Range: 9 - 17 mmol/L 13   Glucose Latest Ref Range: 70 - 99 mg/dL 100 (H)   Calcium Latest Ref Range: 8.6 - 10.4 mg/dL 9.4   Total with Dr. Rahul Hassan, Cardiology. 10. Continue Florinef which is being managed by Dr. Rahul Hassan. 11. Continue follow up with ENT. 12. I would like her to take foods that promote anti inflammation in her body. 13. I would like to see her back in 6 months or earlier if needed. Written by Caryl Stack acting as scribe for Dr. Charissa Borrero. 8/7/2020  9:39 AM      I have reviewed and made changes accordingly to the work scribed by Caryl Stack. The documentation accurately reflects work and decisions made by me. Roxane Phalen, MD   Pediatric Neurology & Epilepsy  8/7/2020    Janith Gosselin is a 21 y.o. female being evaluated by a Virtual Visit (video visit) encounter to address concerns as mentioned above. A caregiver was present when appropriate. Due to this being a TeleHealth encounter ([facility]), evaluation of the following organ systems was limited: Vitals/Constitutional/EENT/Resp/CV/GI//MS/Neuro/Skin/Heme-Lymph-Imm. Pursuant to the emergency declaration under the [address] [place] [address] authority and the "[employer] and [employer] Act, this Virtual Visit was conducted with patient's (and/or legal guardian's) consent, to reduce the patient's risk of exposure to COVID-19 and provide necessary medical care. The patient (and/or legal guardian) has also been advised to contact this office for worsening conditions or problems, and seek emergency medical treatment and/or call 911 if deemed necessary. Services were provided through a video synchronous discussion virtually to substitute for in-person clinic visit. Patient and provider were located at their individual homes. --Jose Santos MD on 8/7/2020 at 10:21 AM    An electronic signature was used to authenticate this note.

## 2020-08-07 NOTE — LETTER
[facility]   [address]  [place], [address]  Phone: [phone]  [phone]        8/16/2020      Kia Rai MD  [address] [facility] [address] [facility] [id number]    Patient: Koko Ellis  YOB: 2000  Date of Visit: 8/16/2020  MRN:  [id number]      Dear Dr. Kia Rai MD      SUBJECTIVE:   It was a pleasure to see Koko Ellis in the Pediatric Neurology Clinic at [facility]. She is a 21 y.o. female presenting to this Virtual visit for a follow up. INTERIM PROGRESS:   HEADACHES:  Carlos Bess states that she has had several headaches since the last visit in December 2019. She states she has approximately 1-2 headaches every week. The last headache occurred last night. She will take Tylenol for these headaches which provides relief. In the past Liseth reported 2-3 headaches per week. She continues to take Omega 3, Vitamin D, Vitamin C, Magnesium Oxide, Turmeric, Co Q 10 and Trokendi XR. Headache description provided below:    HEADACHE DESCRIPTION:    These headaches are of a high intensity, occurring in different areas depending on the day. She is not able to do her daily activities and this does result in interruption of school or other activities at home. There is associated light or sound sensitivity with some headaches, but no neurological deficits with headaches. Such a headache would usually last 2 hours. She states that she will take Ibuprofen and this seems to provide relief. MIGRAINES:  Carlos Bess states the migraines also continue to occur. She states she has had approximately 4 migraines since the last visit in December 2019. The last migraine occurred approximately 1 month ago. She reports nausea and vomiting with these migraines. Light and sound sensitivity also reported. She states she will take Tylenol and lay in a dark quiet room for relief.  Migraine description provided below:     MIGRAINE DESCRIPTION; They describe the pain to occur in different parts of the head depending on the day. She states that the high intensity. Prior to the migraine, the child would have visual aura consisting of seeing flashing lights. Nausea or vomiting Sometimes accompanies the migraines. The child will prefer to go and sleep in a dark, quiet room. There is associated tingling in her head  with these migraines. Ibuprofen seems to provide some relief. HEARING LOSS:  Jerardo Pitts states that her hearing loss fluctuates. She states she can hear better in the right ear. This continues to remain unchanged. She continues to hear ringing in both ears. It should be recalled she has followed up with ENT in this regard who did not have any further recommendations. Jerardo Pitts states that she has had genetic studies and chromosomal microadenoma as well as multiple other tests in the past without any clear etiology. DIZZINESS:   Jerardo Pitts states the dizziness continues to occur intermittently. She states the last episode occurred 2 days ago. She states her episodes are a \"wave of dizziness\" lasting approximately 5-10 seconds and will then go away. In the past, she reported the dizziness will occur approximately twice per month at random times and can last up to 20 minutes in duration. No syncopal events reported. She states if she gets up to fast she will feel as if she ill pass out. Jerardo Pitts continues to follow with Dr. Celia Sharif in this regard. She remains on Florinef in this regard which is also managed by Dr. Celia Sharif. REVIEW OF SYSTEMS:  Constitutional: Negative. Eyes: Negative. Respiratory: Negative. Cardiovascular: Negative. Gastrointestinal: Negative. Genitourinary: Negative. Musculoskeletal: Negative    Skin: Negative. Neurological: positive for headaches and dizziness, negative for seizures, negative for developmental delays. Hematological: Negative. Psychiatric/Behavioral: negative for behavioral issues, negative for ADHD     All other systems reviewed and are negative. Past, social, family, and developmental history was reviewed and unchanged. OBJECTIVE:   PHYSICAL EXAM  LMP 07/31/2020 (Exact Date)      Constitutional: [x] Appears well-developed and well-nourished [x] No apparent distress      [] Abnormal-   Mental status  [x] Alert and awake  [x] Oriented to person/place/time [x]Able to follow commands      Eyes:  EOM    [x]  Normal  [] Abnormal-  Sclera  [x]  Normal  [] Abnormal -         Discharge [x]  None visible  [] Abnormal -    HENT:   [x] Normocephalic, atraumatic. [] Abnormal   [x] Mouth/Throat: Mucous membranes are moist.     External Ears [x] Normal  [] Abnormal-     Neck: [x] No visualized mass     Pulmonary/Chest: [x] Respiratory effort normal.  [x] No visualized signs of difficulty breathing or respiratory distress        [] Abnormal-      Musculoskeletal:   [x] Normal gait with no signs of ataxia         [x] Normal range of motion of neck        [] Abnormal-     Neurological:        [x] No Facial Asymmetry (Cranial nerve 7 motor function) (limited exam to video visit)          [x] No gaze palsy        [] Abnormal-         Skin:        [x] No significant exanthematous lesions or discoloration noted on facial skin         [] Abnormal-            Psychiatric:       [x] Normal Affect [] No Hallucinations        [] Abnormal-     RECORD REVIEW: Previous medical records were reviewed at today's visit. DIAGNOSTIC STUDIES:  10/17/2017 - EEG - Normal   11/4/2017 - MRI Brain - Normal   June 2016 - CBC - NORMAL, electrolytes - normal, TSH- normal, SORAIDA screen - negative, CRP 0.053, ESR- 18  MRI of the brain in October 2011 was reported as normal.  MRI of the brain in June 2013 reported Small focus of T2 hyperintensity Measuring 5 mm in the left centrum semiovale of unexplained etiology. Ref.  Range 7/17/2019 10:05 Sodium Latest Ref Range: 135 - 144 mmol/L 139   Potassium Latest Ref Range: 3.7 - 5.3 mmol/L 4.2   Chloride Latest Ref Range: 98 - 107 mmol/L 102   CO2 Latest Ref Range: 20 - 31 mmol/L 24   BUN Latest Ref Range: 6 - 20 mg/dL 15   Creatinine Latest Ref Range: 0.50 - 0.90 mg/dL 0.67   Anion Gap Latest Ref Range: 9 - 17 mmol/L 13   Glucose Latest Ref Range: 70 - 99 mg/dL 100 (H)   Calcium Latest Ref Range: 8.6 - 10.4 mg/dL 9.4   Total Protein Latest Ref Range: 6.4 - 8.3 g/dL 8.1   Chol/HDL Ratio Latest Ref Range: <5  3.0   Cholesterol Latest Ref Range: <200 mg/dL 156   HDL Cholesterol Latest Ref Range: >40 mg/dL 52   LDL Cholesterol Latest Ref Range: 0 - 130 mg/dL 95   Triglycerides Latest Ref Range: <150 mg/dL 46   Albumin Latest Ref Range: 3.5 - 5.2 g/dL 4.3   Alk Phos Latest Ref Range: 35 - 104 U/L 75   ALT Latest Ref Range: 5 - 33 U/L 22   AST Latest Ref Range: <32 U/L 19   Bilirubin Latest Ref Range: 0.3 - 1.2 mg/dL 0.32   TSH Latest Ref Range: 0.30 - 5.00 mIU/L 1.24   WBC Latest Ref Range: 4.5 - 13.5 k/uL 9.0   RBC Latest Ref Range: 4.0 - 5.2 m/uL 4.80   Hemoglobin Quant Latest Ref Range: 12.0 - 16.0 g/dL 13.0   Hematocrit Latest Ref Range: 36 - 46 % 39.7   Platelet Count Latest Ref Range: 150 - 450 k/uL 465 (H)       ASSESSMENT:   Sam Escalante is a 21 y.o. female with:  1. Generalized headaches which continue to persist.  2. Meniere disease, bilateral which fluctuates in severity. She continues to take Meclizine on a when necessary basis. Some of her dizziness symptoms are also in relation to the NCS. 3. Migraine variant. 4. Sensorineural hearing loss, etiology which is likely in relation to Ménière's disease. She followed with ENT in the past and is not yet a candidate for a cochlear implant. 5. Abnormal MRI of the brain revealing a small focus of T2 hyperintensity in the left centrum semiovale measuring approximately 5 mm, of unexplained etiology. A subsequent MRI in 2017 was within normal limits.

## 2020-08-07 NOTE — PATIENT INSTRUCTIONS
1. Continue Trokendi XR but increase the dose to 125 mg daily. 2. Continue Omega 3 supplement on a daily basis. 3. Continue Black Seed Oil ( Nigella Sativa) 1 capsule daily. 4. Continue Vitamin D3 at 1000 units daily. 5. Continue Meclizine at 25 mg daily as needed. 6. Continue Magnesium Oxide at 250 mg at night. 7. Continue Turmeric at 450 mg daily. 8. Continue Co Q 10 at 300 mg daily. 9. Continue follow up with Dr. Olaf Hidalgo, Cardiology. 10. Continue Florinef which is being managed by Dr. Olaf Hidalgo. 11. Continue follow up with ENT. 12. I would like her to take foods that promote anti inflammation in her body. 13. I would like to see her back in 6 months or earlier if needed.

## 2020-08-10 ENCOUNTER — TELEPHONE (OUTPATIENT)
Dept: FAMILY MEDICINE CLINIC | Age: 20
End: 2020-08-10

## 2020-08-10 ENCOUNTER — TELEMEDICINE (OUTPATIENT)
Dept: PSYCHIATRY | Age: 20
End: 2020-08-10
Payer: MEDICARE

## 2020-08-10 PROCEDURE — G8427 DOCREV CUR MEDS BY ELIG CLIN: HCPCS | Performed by: PSYCHIATRY & NEUROLOGY

## 2020-08-10 PROCEDURE — 99205 OFFICE O/P NEW HI 60 MIN: CPT | Performed by: PSYCHIATRY & NEUROLOGY

## 2020-08-10 RX ORDER — ATOMOXETINE 18 MG/1
18 CAPSULE ORAL DAILY
Qty: 30 CAPSULE | Refills: 0 | Status: SHIPPED | OUTPATIENT
Start: 2020-08-10 | End: 2020-08-18

## 2020-08-10 NOTE — TELEPHONE ENCOUNTER
Patient has low blood pressure and she is also on birth control on Sunday (Loestrin) and is not able to take Strattera. Please advise.     Uyen on [place]

## 2020-08-10 NOTE — PROGRESS NOTES
Patient's location is home    Physician location is [facility]    Mode of assessment is video assessment    Reason for referral is ADHD    Chief complaint    I want to see if I have ADHD    History of present illness    Patient is a 21years old female who gives a history of inattention since her early childhood years which caused her to struggle through schooling and she would have to work twice as hard as other kids to get by. Roughly around 8 to 9 years ago she also started having hearing loss which complicated her presentation even further. Patient states that even prior to losing her hearing she would daydream a lot she was she would lose things easily and had to be reminded several times by others to get her attention. Patient also gives a history of social phobia and is currently taking Prozac 20 mg a day with no side effects reported and positive response. She is denying any history of depression earline hypomania or psychotic symptoms. Denies any history of illicit drug or alcohol use or eating disorder. Patient's developmental history significant for out-of-home placement in foster care due to maternal drug use. Patient is denying any sign symptoms suggestive of any other anxiety disorder. Past Psychiatric History:   Hospitalizations: None  Outpatient psychiatry: none  Previous medications tried: Prozac  History of suicidal attempts or ideations: no  History of homicidal attempts or ideations: no  History of exposure to trauma:  Yes . denies any hnoistory of PTSD symptoms.     History of drug and alcohol use: no    Lifetime Psychiatric Review of Systems:   Psychosis: No  Depression: No  Earline: No  Hypomania: No  Primary anxiety disorder symptoms: Yes    Past Medical History:   Diagnosis Date    Anxiety 7/17/2019    Class 2 obesity due to excess calories without serious comorbidity with body mass index (BMI) of 37.0 to 37.9 in adult 7/17/2019    Headache(784.0)     Meniere syndrome  Mild intermittent asthma 8/17/2017    Vision loss 3/29/2016     Family History   Problem Relation Age of Onset    Asthma Sister     Diabetes Maternal Grandmother     High Blood Pressure Maternal Grandmother     Heart Disease Maternal Grandmother     Asthma Maternal Grandmother     Other Maternal Grandmother         GERSON with CPAP    Asthma Brother      Social History     Socioeconomic History    Marital status: Single     Spouse name: None    Number of children: None    Years of education: None    Highest education level: None   Occupational History    None   Social Needs    Financial resource strain: None    Food insecurity     Worry: None     Inability: None    Transportation needs     Medical: None     Non-medical: None   Tobacco Use    Smoking status: Never Smoker    Smokeless tobacco: Never Used   Substance and Sexual Activity    Alcohol use: No    Drug use: No    Sexual activity: Not Currently   Lifestyle    Physical activity     Days per week: None     Minutes per session: None    Stress: None   Relationships    Social connections     Talks on phone: None     Gets together: None     Attends Yazidism service: None     Active member of club or organization: None     Attends meetings of clubs or organizations: None     Relationship status: None    Intimate partner violence     Fear of current or ex partner: None     Emotionally abused: None     Physically abused: None     Forced sexual activity: None   Other Topics Concern    None   Social History Narrative    None       Review of Systems:  Constitutional: Negative. HENT: Negative. Eyes: Negative. Respiratory: Negative. Cardiovascular: Negative. Gastrointestinal: Negative. Genitourinary: Negative . Musculoskeletal: Negative. Skin: Negative. Neurological: Negative. Endo/Heme/Allergies: Negative.      Labs:  No results found for: LABA1C  No results found for: EAG   Lab Results   Component Value Date    WBC 9.0 07/17/2019    HGB 13.0 07/17/2019    HCT 39.7 07/17/2019    MCV 82.7 07/17/2019     (H) 07/17/2019      Lab Results   Component Value Date     07/17/2019    K 4.2 07/17/2019     07/17/2019    CO2 24 07/17/2019    BUN 15 07/17/2019    CREATININE 0.67 07/17/2019    GLUCOSE 100 07/17/2019    CALCIUM 9.4 07/17/2019       Lab Results   Component Value Date    TSH 1.00 07/31/2020      Lab Results   Component Value Date    CHOL 156 07/17/2019    CHOL 179 06/25/2013     Lab Results   Component Value Date    TRIG 46 07/17/2019    TRIG 55 06/25/2013     Lab Results   Component Value Date    HDL 52 07/17/2019    HDL 69 06/25/2013     Lab Results   Component Value Date    LDLCHOLESTEROL 95 07/17/2019    LDLCHOLESTEROL 99 06/25/2013     Lab Results   Component Value Date    VLDL NOT REPORTED 07/17/2019    VLDL NOT REPORTED 06/25/2013     Lab Results   Component Value Date    CHOLHDLRATIO 3.0 07/17/2019    CHOLHDLRATIO 2.6 06/25/2013      Lab Results   Component Value Date     07/17/2019    BUN 15 07/17/2019    CREATININE 0.67 07/17/2019    TSH 1.00 07/31/2020    WBC 9.0 07/17/2019      No results found for: PHENYTOIN, PHENOBARB, VALPROATE, CBMZ     Vitals:  BP      Temp      Pulse     Resp      SpO2          Current Medications:   Current Outpatient Medications   Medication Sig Dispense Refill    atomoxetine (STRATTERA) 18 MG capsule Take 1 capsule by mouth daily 30 capsule 0    magnesium oxide (MAG-OX) 250 MG TABS tablet Take 1 tablet by mouth every evening 30 tablet 6    Turmeric 500 MG TABS Take 450 mg by mouth daily 30 tablet 6    coenzyme Q10 100 MG CAPS capsule Take 2 capsules by mouth every morning 60 capsule 6    topiramate ER (TROKENDI XR) 100 MG CP24 Take 1 capsule by mouth daily 30 capsule 6    Vitamin D (CHOLECALCIFEROL) 25 MCG (1000 UT) TABS tablet Take 1 tablet by mouth daily 30 tablet 6    topiramate ER (TROKENDI XR) 25 MG CP24 Take 1 caps along with the 100 mg capsule 30 capsule 5  albuterol sulfate  (90 Base) MCG/ACT inhaler 2 puffs as needed      loratadine (CLARITIN) 10 MG capsule 1 tablet      meclizine (ANTIVERT) 12.5 MG tablet       norethindrone-ethinyl estradiol-iron (LOESTRIN FE 1.5/30) 1.5-30 MG-MCG tablet Take 1 tablet by mouth daily 1 packet 3    ibuprofen (ADVIL;MOTRIN) 800 MG tablet Take 1 tablet by mouth every 6 hours as needed for Pain 28 tablet 2    FLUoxetine (PROZAC) 20 MG capsule Take 1 capsule by mouth daily 60 capsule 2    montelukast (SINGULAIR) 10 MG tablet Take 1 tablet by mouth daily 30 tablet 3    vitamin C (ASCORBIC ACID) 500 MG tablet Take 1 tablet by mouth daily 30 tablet 5    albuterol sulfate HFA (VENTOLIN HFA) 108 (90 Base) MCG/ACT inhaler Inhale 2 puffs into the lungs every 6 hours as needed for Wheezing . Take two prophylactic puffs 5-15 min prior to exercise. 1 Inhaler 1    ondansetron (ZOFRAN) 4 MG tablet TAKE 1 TABLET BY MOUTH DAILY AS NEEDED FOR NAUSEA OR VOMITING 30 tablet 0    cyproheptadine (PERIACTIN) 4 MG tablet TAKE 1 TABLET BY MOUTH EVERY NIGHT 30 tablet 0    fluticasone (FLONASE) 50 MCG/ACT nasal spray SHAKE LIQUID AND USE 2 SPRAYS IN EACH NOSTRIL EVERY DAY 1 Bottle 5    fludrocortisone (FLORINEF) 0.1 MG tablet Take 0.1 mg by mouth      ibuprofen (ADVIL;MOTRIN) 600 MG tablet Take 1 tablet by mouth every 8 hours as needed for Pain 100 tablet 1    Multiple Vitamins-Minerals (CENTROVITE) TABS Take 1 tablet by mouth daily 30 tablet 11     No current facility-administered medications for this visit. PDMP Monitoring:    Last PDMP Kenn as Reviewed [facility]):  Review User Review Instant Review Result            Urine Drug Screenings (1 yr)     No resulted procedures found. Medication Contract and Consent for Opioid Use Documents Filed      No documents found                Mental Status Exam:  Patient is alert and oriented to time , place , person and self.    She  is cooperative with fair eye contact and fair grooming Ryan Esau of stated age . Mood is ' ok'  . Affect is congruent . Thought process is linear and goal directed . Thought content is negative for  Suicidal or homicidal thoughts . Denies any hallucinations or delusions . Insight is fair . Judgement is fair. Memory :intact . Attention : Intact. Intellect :  average per vocabulary and fund of knowledge . Psychomotor activity : normal.    No abnormal movements noted . Speech is regular rate and rhythm. Impression:   1. Social phobia    2. ADHD (attention deficit hyperactivity disorder), inattentive type         PLAN:  Patient consents to the continuation of Prozac 20 mg p.o. daily for her social phobia and she consents to a trial of Strattera 18 mg p.o. daily to be titrated as per patient's response. Patient will return to clinic within 4 weeks and could call if needed to be seen sooner.

## 2020-08-11 ENCOUNTER — TELEPHONE (OUTPATIENT)
Dept: PSYCHIATRY | Age: 20
End: 2020-08-11

## 2020-08-11 NOTE — TELEPHONE ENCOUNTER
LM for pt to call back  Need to know:  - Who said there is an interaction between the [employer] and Awanda Copier?   - Also who informed you that Awanda Copier can cause your BP to go down

## 2020-08-11 NOTE — TELEPHONE ENCOUNTER
Spoke with Pt and Reassured her that Dr Timothy Martin said you are fine to take the Strattera and to Monitor your Blood Pressure and if it gets low to give us a call

## 2020-08-18 ENCOUNTER — TELEMEDICINE (OUTPATIENT)
Dept: PSYCHIATRY | Age: 20
End: 2020-08-18
Payer: MEDICARE

## 2020-08-18 PROCEDURE — G8427 DOCREV CUR MEDS BY ELIG CLIN: HCPCS | Performed by: PSYCHIATRY & NEUROLOGY

## 2020-08-18 PROCEDURE — 99214 OFFICE O/P EST MOD 30 MIN: CPT | Performed by: PSYCHIATRY & NEUROLOGY

## 2020-08-18 RX ORDER — ATOMOXETINE 25 MG/1
25 CAPSULE ORAL NIGHTLY
Qty: 30 CAPSULE | Refills: 0 | Status: SHIPPED | OUTPATIENT
Start: 2020-08-18 | End: 2020-10-08 | Stop reason: SDUPTHER

## 2020-08-18 NOTE — PROGRESS NOTES
Psychiatry Progress  Note     Melissa Hall  21 y.o.  female    Patient's chart reviewed . MD location : [address] [place] [address]  Patient location : Home. Video assessment . Patient seen today reporting that she has been tired since started Strattera QAM.   Denies anxiety or depression . Reports compliance. Denies active illicit drug or alcohol use . No sleep or appetite problems reported. School is starting next week. Mental Status:  Patient is alert and oriented to time , place , person and self. She  is cooperative with fair eye contact and fair grooming . Appears of stated age . Mood is ' ok'  . Affect is congruent . Thought process is linear and goal directed . Thought content is negative for  Suicidal or homicidal thoughts . Denies any hallucinations or delusions . Insight is fair . Judgement is fair. Memory : immediate and after 5 min recall is 3/3 . Long term memory is intact . Attention : could spell 'chair ' forwards and backwards . Intellect :  average per vocabulary and fund of knowledge . Psychomotor activity : normal.    No abnormal movements noted . Speech is regular rate and rhythm. Review of Systems:  Review of Systems   Constitutional: Negative. HENT: Negative. Eyes: Negative. Respiratory: Negative. Cardiovascular: Negative. Gastrointestinal: Negative. Genitourinary: Negative . Musculoskeletal: Negative. Skin: Negative. Neurological: Negative. Endo/Heme/Allergies: Negative.      Labs -  No results found for: LABA1C  No results found for: EAG   Lab Results   Component Value Date    WBC 9.0 07/17/2019    HGB 13.0 07/17/2019    HCT 39.7 07/17/2019    MCV 82.7 07/17/2019     (H) 07/17/2019      Lab Results   Component Value Date     07/17/2019    K 4.2 07/17/2019     07/17/2019    CO2 24 07/17/2019    BUN 15 07/17/2019    CREATININE 0.67 07/17/2019    GLUCOSE 100 07/17/2019    CALCIUM 9.4 07/17/2019       Lab Results   Component Value Date    TSH 1.00 07/31/2020      Lab Results   Component Value Date    CHOL 156 07/17/2019    CHOL 179 06/25/2013     Lab Results   Component Value Date    TRIG 46 07/17/2019    TRIG 55 06/25/2013     Lab Results   Component Value Date    HDL 52 07/17/2019    HDL 69 06/25/2013     Lab Results   Component Value Date    LDLCHOLESTEROL 95 07/17/2019    LDLCHOLESTEROL 99 06/25/2013     Lab Results   Component Value Date    VLDL NOT REPORTED 07/17/2019    VLDL NOT REPORTED 06/25/2013     Lab Results   Component Value Date    CHOLHDLRATIO 3.0 07/17/2019    CHOLHDLRATIO 2.6 06/25/2013      Lab Results   Component Value Date     07/17/2019    BUN 15 07/17/2019    CREATININE 0.67 07/17/2019    TSH 1.00 07/31/2020    WBC 9.0 07/17/2019      No results found for: PHENYTOIN, PHENOBARB, VALPROATE, CBMZ       Vitals -   BP      Temp      Pulse     Resp      SpO2          Current Medications-   Current Outpatient Medications   Medication Sig Dispense Refill    atomoxetine (STRATTERA) 18 MG capsule Take 1 capsule by mouth daily 30 capsule 0    magnesium oxide (MAG-OX) 250 MG TABS tablet Take 1 tablet by mouth every evening 30 tablet 6    Turmeric 500 MG TABS Take 450 mg by mouth daily 30 tablet 6    coenzyme Q10 100 MG CAPS capsule Take 2 capsules by mouth every morning 60 capsule 6    topiramate ER (TROKENDI XR) 100 MG CP24 Take 1 capsule by mouth daily 30 capsule 6    Vitamin D (CHOLECALCIFEROL) 25 MCG (1000 UT) TABS tablet Take 1 tablet by mouth daily 30 tablet 6    topiramate ER (TROKENDI XR) 25 MG CP24 Take 1 caps along with the 100 mg capsule 30 capsule 5    albuterol sulfate  (90 Base) MCG/ACT inhaler 2 puffs as needed      loratadine (CLARITIN) 10 MG capsule 1 tablet      meclizine (ANTIVERT) 12.5 MG tablet       norethindrone-ethinyl estradiol-iron (LOESTRIN FE 1.5/30) 1.5-30 MG-MCG tablet Take 1 tablet by mouth daily 1 packet 3    ibuprofen (ADVIL;MOTRIN) 800 MG tablet Take 1 tablet by mouth

## 2020-10-09 RX ORDER — ATOMOXETINE 25 MG/1
25 CAPSULE ORAL NIGHTLY
Qty: 30 CAPSULE | Refills: 0 | Status: SHIPPED | OUTPATIENT
Start: 2020-10-09 | End: 2021-01-14 | Stop reason: SINTOL

## 2020-10-12 RX ORDER — NORETHINDRONE ACETATE AND ETHINYL ESTRADIOL 1.5-30(21)
1 KIT ORAL DAILY
Qty: 1 PACKET | Refills: 3 | Status: SHIPPED | OUTPATIENT
Start: 2020-10-12 | End: 2020-11-02 | Stop reason: SDUPTHER

## 2020-11-02 RX ORDER — NORETHINDRONE ACETATE AND ETHINYL ESTRADIOL 1.5-30(21)
1 KIT ORAL DAILY
Qty: 1 PACKET | Refills: 3 | Status: SHIPPED | OUTPATIENT
Start: 2020-11-02 | End: 2020-12-01 | Stop reason: SDUPTHER

## 2020-12-01 RX ORDER — NORETHINDRONE ACETATE AND ETHINYL ESTRADIOL 1.5-30(21)
1 KIT ORAL DAILY
Qty: 1 PACKET | Refills: 3 | Status: SHIPPED | OUTPATIENT
Start: 2020-12-01 | End: 2020-12-31 | Stop reason: SDUPTHER

## 2021-01-04 RX ORDER — NORETHINDRONE ACETATE AND ETHINYL ESTRADIOL 1.5-30(21)
1 KIT ORAL DAILY
Qty: 1 PACKET | Refills: 3 | Status: SHIPPED | OUTPATIENT
Start: 2021-01-04 | End: 2021-01-22 | Stop reason: SDUPTHER

## 2021-01-14 ENCOUNTER — TELEMEDICINE (OUTPATIENT)
Dept: PSYCHIATRY | Age: 21
End: 2021-01-14
Payer: MEDICARE

## 2021-01-14 DIAGNOSIS — F90.0 ADHD (ATTENTION DEFICIT HYPERACTIVITY DISORDER), INATTENTIVE TYPE: Primary | ICD-10-CM

## 2021-01-14 DIAGNOSIS — F40.10 SOCIAL PHOBIA: ICD-10-CM

## 2021-01-14 PROCEDURE — G8427 DOCREV CUR MEDS BY ELIG CLIN: HCPCS | Performed by: NURSE PRACTITIONER

## 2021-01-14 PROCEDURE — 99213 OFFICE O/P EST LOW 20 MIN: CPT | Performed by: NURSE PRACTITIONER

## 2021-01-14 RX ORDER — BUPROPION HYDROCHLORIDE 75 MG/1
75 TABLET ORAL 2 TIMES DAILY
Qty: 60 TABLET | Refills: 0 | Status: SHIPPED | OUTPATIENT
Start: 2021-01-14 | End: 2021-01-21

## 2021-01-14 NOTE — PROGRESS NOTES
Behavioral Health Consultation  Deandra Manzanares, MSN, APRN-CNP, PMHNP-BC  1/14/2021, 11:12 AM      Time spent with Patient:  30 minutes  This  was a telehealth visit. Patient Location: Home. Provider Location: Home office in [place], [place]  This virtual visit was conducted via interactive/real-time audio/video. Chief Complaint:follow up for depression and anxiety, adhd. [place]:  Reason for visit is medication management follow up. She has not been compliant with medications. Pt reports that medications have not  been working. Lyudmila Moran states that she was having some racing thoughts with the strattera, she states. Pt reports side effects from medications. Pt denies hallucinations. Pt reports there has been no changes to appetite. Pt reports sleep has been alright. Pt denies  current exercise. Pt denies current suicidal ideation, plan and intent. Pt  denies current homicidal ideation, plan and [email or web address]). Past Psychiatric History:   Hospitalizations: None  Outpatient psychiatry: none  Previous medications tried: Prozac  History of suicidal attempts or ideations: no  History of homicidal attempts or ideations: no  History of exposure to trauma:  Yes . denies any hnoistory of PTSD symptoms. History of drug and alcohol use: no     Lifetime Psychiatric Review of Systems:   Psychosis: No  Depression: No  Earline: No  Hypomania: No  Primary anxiety disorder symptoms: Yes    MSE:    Appearance: alert, cooperative  Attention:Intact  Appetite: normal  Ambulation: unable to assess due to telehealth. Sleep disturbance: No  Loss of pleasure: No  Speech: spontaneous, normal rate, normal volume and well articulated  Mood: \"good\"   Judgment: Intact  Memory: Intact long-term and Intact short-term  Suicide Assessment: no suicidal ideation  Homicide Assessment: denies current homicidal ideation, plan and intent    History:      Review of Systems:  Review of Systems   Constitutional: Negative.    HENT: Negative. Eyes: Negative. Respiratory: Negative. Cardiovascular: Negative. Gastrointestinal: Negative. Genitourinary: Negative . Musculoskeletal: Negative. Skin: Negative. Neurological: Negative. Endo/Heme/Allergies: Negative. Current Outpatient Medications:     buPROPion (WELLBUTRIN) 75 MG tablet, Take 1 tablet by mouth 2 times daily, Disp: 60 tablet, Rfl: 0    norethindrone-ethinyl estradiol-iron (LOESTRIN FE 1.5/30) 1.5-30 MG-MCG tablet, Take 1 tablet by mouth daily, Disp: 1 packet, Rfl: 3    magnesium oxide (MAG-OX) 250 MG TABS tablet, Take 1 tablet by mouth every evening, Disp: 30 tablet, Rfl: 6    Turmeric 500 MG TABS, Take 450 mg by mouth daily, Disp: 30 tablet, Rfl: 6    coenzyme Q10 100 MG CAPS capsule, Take 2 capsules by mouth every morning, Disp: 60 capsule, Rfl: 6    topiramate ER (TROKENDI XR) 100 MG CP24, Take 1 capsule by mouth daily, Disp: 30 capsule, Rfl: 6    Vitamin D (CHOLECALCIFEROL) 25 MCG (1000 UT) TABS tablet, Take 1 tablet by mouth daily, Disp: 30 tablet, Rfl: 6    topiramate ER (TROKENDI XR) 25 MG CP24, Take 1 caps along with the 100 mg capsule, Disp: 30 capsule, Rfl: 5    albuterol sulfate  (90 Base) MCG/ACT inhaler, 2 puffs as needed, Disp: , Rfl:     loratadine (CLARITIN) 10 MG capsule, 1 tablet, Disp: , Rfl:     meclizine (ANTIVERT) 12.5 MG tablet, , Disp: , Rfl:     ibuprofen (ADVIL;MOTRIN) 800 MG tablet, Take 1 tablet by mouth every 6 hours as needed for Pain, Disp: 28 tablet, Rfl: 2    montelukast (SINGULAIR) 10 MG tablet, Take 1 tablet by mouth daily, Disp: 30 tablet, Rfl: 3    vitamin C (ASCORBIC ACID) 500 MG tablet, Take 1 tablet by mouth daily, Disp: 30 tablet, Rfl: 5    albuterol sulfate HFA (VENTOLIN HFA) 108 (90 Base) MCG/ACT inhaler, Inhale 2 puffs into the lungs every 6 hours as needed for Wheezing .  Take two prophylactic puffs 5-15 min prior to exercise., Disp: 1 Inhaler, Rfl: 1    ondansetron (ZOFRAN) 4 MG tablet, TAKE 1 TABLET BY MOUTH DAILY AS NEEDED FOR NAUSEA OR VOMITING, Disp: 30 tablet, Rfl: 0    cyproheptadine (PERIACTIN) 4 MG tablet, TAKE 1 TABLET BY MOUTH EVERY NIGHT, Disp: 30 tablet, Rfl: 0    fluticasone (FLONASE) 50 MCG/ACT nasal spray, SHAKE LIQUID AND USE 2 SPRAYS IN EACH NOSTRIL EVERY DAY, Disp: 1 Bottle, Rfl: 5    fludrocortisone (FLORINEF) 0.1 MG tablet, Take 0.1 mg by mouth, Disp: , Rfl:     ibuprofen (ADVIL;MOTRIN) 600 MG tablet, Take 1 tablet by mouth every 8 hours as needed for Pain, Disp: 100 tablet, Rfl: 1    Multiple Vitamins-Minerals (CENTROVITE) TABS, Take 1 tablet by mouth daily, Disp: 30 tablet, Rfl: 11     PDMP Monitoring:    Last PDMP Kenn as Reviewed [facility]):  Review User Review Instant Review Result            Urine Drug Screenings (1 yr)     No resulted procedures found. Medication Contract and Consent for Opioid Use Documents Filed      No documents found                 OARRS checked and there were no signs of substance abuse, or prescription misuse.          Social History     Socioeconomic History    Marital status: Single     Spouse name: Not on file    Number of children: Not on file    Years of education: Not on file    Highest education level: Not on file   Occupational History    Not on file   Social Needs    Financial resource strain: Not on file    Food insecurity     Worry: Not on file     Inability: Not on file    Transportation needs     Medical: Not on file     Non-medical: Not on file   Tobacco Use    Smoking status: Never Smoker    Smokeless tobacco: Never Used   Substance and Sexual Activity    Alcohol use: No    Drug use: No    Sexual activity: Not Currently   Lifestyle    Physical activity     Days per week: Not on file     Minutes per session: Not on file    Stress: Not on file   Relationships    Social connections     Talks on phone: Not on file     Gets together: Not on file     Attends Judaism service: Not on file     Active member of club or organization: Not on file     Attends meetings of clubs or organizations: Not on file     Relationship status: Not on file    Intimate partner violence     Fear of current or ex partner: Not on file     Emotionally abused: Not on file     Physically abused: Not on file     Forced sexual activity: Not on file   Other Topics Concern    Not on file   Social History Narrative    Not on file       TOBACCO: Martinez Cruz  reports that she has never smoked. She has never used smokeless tobacco.  ETOH: Liseth  reports no history of alcohol use. Past Medical History:   Diagnosis Date    Anxiety 7/17/2019    Class 2 obesity due to excess calories without serious comorbidity with body mass index (BMI) of 37.0 to 37.9 in adult 7/17/2019    Headache(784.0)     Meniere syndrome     Mild intermittent asthma 8/17/2017    Vision loss 9/48/5957      Metabolic monitoring is being done by PCP.    Family History   Problem Relation Age of Onset    Asthma Sister     Diabetes Maternal Grandmother     High Blood Pressure Maternal Grandmother     Heart Disease Maternal Grandmother     Asthma Maternal Grandmother     Other Maternal Grandmother         GERSON with CPAP    Asthma Brother      Last Labs: No results found for: LABA1C  No results found for: EAG   Lab Results   Component Value Date    WBC 9.0 07/17/2019    HGB 13.0 07/17/2019    HCT 39.7 07/17/2019    MCV 82.7 07/17/2019     (H) 07/17/2019    LYMPHOPCT 20 (L) 02/02/2018    RBC 4.80 07/17/2019    MCH 27.1 07/17/2019    MCHC 32.7 07/17/2019    RDW 13.6 07/17/2019          Lab Results   Component Value Date     07/17/2019    K 4.2 07/17/2019     07/17/2019    CO2 24 07/17/2019    BUN 15 07/17/2019    CREATININE 0.67 07/17/2019    GLUCOSE 100 (H) 07/17/2019    CALCIUM 9.4 07/17/2019    PROT 8.1 07/17/2019    LABALBU 4.3 07/17/2019    BILITOT 0.32 07/17/2019    ALKPHOS 75 07/17/2019    AST 19 07/17/2019    ALT 22 07/17/2019    LABGLOM  07/17/2019     Pediatric GFR requires additional information. Refer to [facility] website for calculator. GFRAA NOT REPORTED 07/17/2019      . last    Diagnosis:      1. ADHD (attention deficit hyperactivity disorder), inattentive type    2. Social phobia      Plan:    Discussed starting wellbutrin and referring to [facility]. Discussed risks and benefits of medications, as well as need for yearly lab work. Follow up with [facility] for continued treatment. 25 minutes spent face to face with greater than 50% was spent coordinating care, and therapy. Continue work with PCP to manage medical concerns, and [employer] [facility] for continued follow-up. No orders of the defined types were placed in this encounter.      Orders Placed This Encounter   Medications    buPROPion (WELLBUTRIN) 75 MG tablet     Sig: Take 1 tablet by mouth 2 times daily     Dispense:  60 tablet     Refill:  0       Pt interventions:    Discussed importance of medication adherence, Discussed risks, benefits, side effects of medication and need for follow up treatment, Discussed benefits of referral for specialty care and Discussed self-care (sleep, nutrition, rewarding activities, social support, exercise)

## 2021-01-21 ENCOUNTER — VIRTUAL VISIT (OUTPATIENT)
Dept: PEDIATRIC NEUROLOGY | Age: 21
End: 2021-01-21
Payer: MEDICARE

## 2021-01-21 DIAGNOSIS — G90.1 DYSAUTONOMIA (HCC): ICD-10-CM

## 2021-01-21 DIAGNOSIS — R51.9 CHRONIC INTRACTABLE HEADACHE, UNSPECIFIED HEADACHE TYPE: ICD-10-CM

## 2021-01-21 DIAGNOSIS — H81.03 MENIERE'S DISEASE OF BOTH EARS: ICD-10-CM

## 2021-01-21 DIAGNOSIS — R42 VERTIGO: Primary | ICD-10-CM

## 2021-01-21 DIAGNOSIS — G89.29 CHRONIC INTRACTABLE HEADACHE, UNSPECIFIED HEADACHE TYPE: ICD-10-CM

## 2021-01-21 DIAGNOSIS — G43.809 MIGRAINE VARIANT: ICD-10-CM

## 2021-01-21 PROCEDURE — 99215 OFFICE O/P EST HI 40 MIN: CPT | Performed by: PSYCHIATRY & NEUROLOGY

## 2021-01-21 PROCEDURE — G8427 DOCREV CUR MEDS BY ELIG CLIN: HCPCS | Performed by: PSYCHIATRY & NEUROLOGY

## 2021-01-21 RX ORDER — TURMERIC ROOT EXTRACT 500 MG
450 TABLET ORAL DAILY
Qty: 30 TABLET | Refills: 6 | Status: SHIPPED | OUTPATIENT
Start: 2021-01-21 | End: 2021-08-18 | Stop reason: SDUPTHER

## 2021-01-21 RX ORDER — VITAMIN B COMPLEX
1000 TABLET ORAL DAILY
Qty: 30 TABLET | Refills: 6 | Status: SHIPPED | OUTPATIENT
Start: 2021-01-21 | End: 2021-08-18 | Stop reason: SDUPTHER

## 2021-01-21 RX ORDER — TOPIRAMATE 25 MG/1
CAPSULE, EXTENDED RELEASE ORAL
Qty: 30 CAPSULE | Refills: 5 | Status: SHIPPED | OUTPATIENT
Start: 2021-01-21 | End: 2022-03-31 | Stop reason: ALTCHOICE

## 2021-01-21 RX ORDER — PNV NO.95/FERROUS FUM/FOLIC AC 28MG-0.8MG
250 TABLET ORAL EVERY EVENING
Qty: 30 TABLET | Refills: 6 | Status: SHIPPED | OUTPATIENT
Start: 2021-01-21 | End: 2021-08-18

## 2021-01-21 RX ORDER — UBIDECARENONE 100 MG
200 CAPSULE ORAL EVERY MORNING
Qty: 60 CAPSULE | Refills: 6 | Status: SHIPPED | OUTPATIENT
Start: 2021-01-21 | End: 2021-08-18 | Stop reason: SDUPTHER

## 2021-01-21 RX ORDER — TOPIRAMATE 100 MG/1
CAPSULE, EXTENDED RELEASE ORAL
Qty: 30 CAPSULE | Refills: 6 | Status: SHIPPED | OUTPATIENT
Start: 2021-01-21 | End: 2022-03-31 | Stop reason: ALTCHOICE

## 2021-01-21 NOTE — LETTER
[facility]   [address] [address], [address]  Phone: [phone]  [phone]        1/21/2021      Divya Cornelius MD  [address] [facility] [id number]    Patient: Casa Corona  YOB: 2000  Date of Visit: 1/21/2021  MRN:  [id number]      Dear Dr. Divya Cornelius MD      SUBJECTIVE:   It was a pleasure to see Casa Corona in the Pediatric Neurology Clinic at [facility]. She is a 21 y.o. female presenting to this Virtual visit for a follow up. INTERIM PROGRESS:   HEADACHES:  Saskia Padron reports that the intermittent headaches continue to persist. She states that she is experiencing headaches once a week. Her most recent headache occurred on 1/18/21. Saskia Padron took Tylenol which provided relief. She reports the headache lasted 3 hours. In the past Saskia Padron reported that she experienced approximately 2-3 headaches per week. She is currently taking Turmeric, Vitamin C,  Omega 3, Vitamin D,  Magnesium Oxide, Co Q 10 and Trokendi XR in this regard, without any reports of side effects or concerns. Headache description provided below:    HEADACHE DESCRIPTION:    These headaches are of a high intensity, occurring in different areas depending on the day. She is not able to do her daily activities and this does result in interruption of school or other activities at home. There is associated light or sound sensitivity with some headaches, but no neurological deficits with headaches. Such a headache would usually last 2 hours. She states that she will take Ibuprofen and this seems to provide relief.     MIGRAINES: Hina Rogers reports that the migraines continue to persist. She states that she has experienced approximately 3 migraines since the last visit in August 2020. Her most recent migraine occurred December 2020. She reports that she took Tylenol and laid down in a dark, quiet room. She denies any light, sound sensitivity, nausea or vomiting. However, she states that light, sound sensitivity, nausea and vomiting accompanies most of her migraines. She will take Tylenol and lay in a dark, quiet room and relief will be provided. Migraine description provided below:     MIGRAINE DESCRIPTION;  They describe the pain to occur in different parts of the head depending on the day. She states that the high intensity. Prior to the migraine, the child would have visual aura consisting of seeing flashing lights. Nausea or vomiting Sometimes accompanies the migraines. The child will prefer to go and sleep in a dark, quiet room. There is associated tingling in her head  with these migraines. Ibuprofen seems to provide some relief. HEARING LOSS:    Hina Rogers reports her hearing loss continues to  fluctuate. She states that her hearing will fluctuate between ears, which ear she will hear out of the best. This  remains unchanged from the last visit. She continues to experience ringing in bilateral ears. It is to be recalled, that Hina Rogers has followed up with ENT in this regard; however, there were no further recommendations. In the past, Hina Rogers completed  genetic studies and chromosomal microadenoma as well as multiple other tests without any clear etiology.      DIZZINESS: Martinez Cruz reports that the intermittent dizziness continues to persist. She states that in the past month and a half she has not experienced any vertigo episodes. She states that her most recent episode occurred in November 2020. She reports that is lasted  2 to 5 minutes in duration. She states she was sitting down at the time. However, positional changes is a trigger for the dizziness. It is to be recalled, that in the past Martinez Cruz reported that the dizziness would occur randomly, twice a month and would last up to 20 minutes in duration. There are no reports of syncopal episodes. She is currently followed by Cardiology, Dr. Phillip Sherman in this regard. She continues to take Florinef in this regard, which is also managed by Dr. Phillip Sherman. REVIEW OF SYSTEMS:  Constitutional: Negative. Eyes: Negative. Respiratory: Negative. Cardiovascular: Negative. Gastrointestinal: Negative. Genitourinary: Negative. Musculoskeletal: Negative    Skin: Negative. Neurological: positive for headaches and dizziness, negative for seizures, negative for developmental delays. Hematological: Negative. Psychiatric/Behavioral: negative for behavioral issues, negative for ADHD     All other systems reviewed and are negative. Past, social, family, and developmental history was reviewed and unchanged. OBJECTIVE:   PHYSICAL EXAM  There were no vitals taken for this visit. Constitutional: [x] Appears well-developed and well-nourished [x] No apparent distress      [] Abnormal-   Mental status  [x] Alert and awake  [x] Oriented to person/place/time [x]Able to follow commands      Eyes:  EOM    [x]  Normal  [] Abnormal-  Sclera  [x]  Normal  [] Abnormal -         Discharge [x]  None visible  [] Abnormal -    HENT:   [x] Normocephalic, atraumatic.   [] Abnormal   [x] Mouth/Throat: Mucous membranes are moist.     External Ears [x] Normal  [] Abnormal-     Neck: [x] No visualized mass Pulmonary/Chest: [x] Respiratory effort normal.  [x] No visualized signs of difficulty breathing or respiratory distress        [] Abnormal-      Musculoskeletal:   [x] Normal gait with no signs of ataxia         [x] Normal range of motion of neck        [] Abnormal-     Neurological:        [x] No Facial Asymmetry (Cranial nerve 7 motor function) (limited exam to video visit)          [x] No gaze palsy        [] Abnormal-         Skin:        [x] No significant exanthematous lesions or discoloration noted on facial skin         [] Abnormal-            Psychiatric:       [x] Normal Affect [] No Hallucinations        [] Abnormal-     RECORD REVIEW: Previous medical records were reviewed at today's visit. DIAGNOSTIC STUDIES:  10/17/2017 - EEG - Normal   11/4/2017 - MRI Brain - Normal   June 2016 - CBC - NORMAL, electrolytes - normal, TSH- normal, SORAIDA screen - negative, CRP 0.053, ESR- 18  MRI of the brain in October 2011 was reported as normal.  MRI of the brain in June 2013 reported Small focus of T2 hyperintensity Measuring 5 mm in the left centrum semiovale of unexplained etiology. Ref.  Range 7/17/2019 10:05   Sodium Latest Ref Range: 135 - 144 mmol/L 139   Potassium Latest Ref Range: 3.7 - 5.3 mmol/L 4.2   Chloride Latest Ref Range: 98 - 107 mmol/L 102   CO2 Latest Ref Range: 20 - 31 mmol/L 24   BUN Latest Ref Range: 6 - 20 mg/dL 15   Creatinine Latest Ref Range: 0.50 - 0.90 mg/dL 0.67   Anion Gap Latest Ref Range: 9 - 17 mmol/L 13   Glucose Latest Ref Range: 70 - 99 mg/dL 100 (H)   Calcium Latest Ref Range: 8.6 - 10.4 mg/dL 9.4   Total Protein Latest Ref Range: 6.4 - 8.3 g/dL 8.1   Chol/HDL Ratio Latest Ref Range: <5  3.0   Cholesterol Latest Ref Range: <200 mg/dL 156   HDL Cholesterol Latest Ref Range: >40 mg/dL 52   LDL Cholesterol Latest Ref Range: 0 - 130 mg/dL 95   Triglycerides Latest Ref Range: <150 mg/dL 46   Albumin Latest Ref Range: 3.5 - 5.2 g/dL 4.3 Alk Phos Latest Ref Range: 35 - 104 U/L 75   ALT Latest Ref Range: 5 - 33 U/L 22   AST Latest Ref Range: <32 U/L 19   Bilirubin Latest Ref Range: 0.3 - 1.2 mg/dL 0.32   TSH Latest Ref Range: 0.30 - 5.00 mIU/L 1.24   WBC Latest Ref Range: 4.5 - 13.5 k/uL 9.0   RBC Latest Ref Range: 4.0 - 5.2 m/uL 4.80   Hemoglobin Quant Latest Ref Range: 12.0 - 16.0 g/dL 13.0   Hematocrit Latest Ref Range: 36 - 46 % 39.7   Platelet Count Latest Ref Range: 150 - 450 k/uL 465 (H)        Ref. Range 7/31/2020 11:15   Glucose, Fasting Latest Ref Range: 70 - 99 mg/dL 96   FSH Latest Ref Range: 1.7 - 21.5 U/L 4.5   LH Latest Ref Range: 1.0 - 95.6 U/L 6.6   Prolactin Latest Ref Range: 4.79 - 23.30 ug/L 21.93   Insulin Latest Units: mU/L 24.6   Insulin Comment Unknown FASTING   TSH Latest Ref Range: 0.30 - 5.00 mIU/L 1.00   Thyroxine, Free Latest Ref Range: 0.93 - 1.70 ng/dL 1.23       ASSESSMENT:   Marissa Solorzano is a 21 y.o. female with:  1. Generalized headaches which continue to persist.  2. Meniere disease, bilateral which fluctuates in severity. She continues to take Meclizine on a when necessary basis. Some of her dizziness symptoms are also in relation to the NCS. 3. Migraine variant. 4. Sensorineural hearing loss, etiology which is likely in relation to Ménière's disease. She followed with ENT in the past and is not yet a candidate for a cochlear implant. 5. Abnormal MRI of the brain revealing a small focus of T2 hyperintensity in the left centrum semiovale measuring approximately 5 mm, of unexplained etiology. A subsequent MRI in 2017 was within normal limits. She will need another MRI brain to assess size of this lesion. PLAN:   1. Continue Trokendi  mg daily. 2. Continue Omega 3 supplement on a daily basis. 3. Continue Black Seed Oil ( Nigella Sativa) 1 capsule daily. 4. Continue Vitamin D3 at 1000 units daily. 5. Continue Meclizine at 25 mg daily as needed. 6. Continue Magnesium Oxide at 250 mg at night. 7. Continue Turmeric at 450 mg daily. 8. Continue Co Q 10 at 300 mg daily. 9. She will need another MRI brain to assess size of this lesion. 10. Continue follow up with Dr. Khanh Monterroso, Cardiology. 11. Continue Florinef which is being managed by Dr. Khanh Monterroso. 12. Continue follow up with ENT. 13. I would like her to take foods that promote anti inflammation in her body. 14. I would like to see her back in 6 months or earlier if needed. Written by Brad Alex RN acting as scribe for Dr. Nicho Bello.   1/21/2021  9:56 AM     I have reviewed and made changes accordingly to the work scribed by Brad Alex RN. The documentation accurately reflects work and decisions made by me. Av Heredia MD   Pediatric Neurology & Epilepsy  1/21/2021    Bibi Garibay is a 21 y.o. female being evaluated by a Virtual Visit (video visit) encounter to address concerns as mentioned above. A caregiver was present when appropriate. Due to this being a TeleHealth encounter (During TXBOW-25 public health emergency), evaluation of the following organ systems was limited: Vitals/Constitutional/EENT/Resp/CV/GI//MS/Neuro/Skin/Heme-Lymph-Imm. Pursuant to the emergency declaration under the [address] [place] [address], [address] [place] [address] authority and the [employer] Act, this Virtual Visit was conducted with patient's (and/or legal guardian's) consent, to reduce the patient's risk of exposure to COVID-19 and provide necessary medical care. The patient (and/or legal guardian) has also been advised to contact this office for worsening conditions or problems, and seek emergency medical treatment and/or call 911 if deemed necessary. Services were provided through a video synchronous discussion virtually to substitute for in-person clinic visit. Patient and provider were located at their individual homes. --Maritza Retana MD on 1/21/2021 at 1:16 PM    An electronic signature was used to authenticate this note. If you have any questions or concerns, please feel free to call me. Thank you again for referring this patient to be seen in our clinic.     Sincerely,        Kristy Grover MD

## 2021-01-21 NOTE — PATIENT INSTRUCTIONS
PLAN:   1. Continue Trokendi  mg daily. 2. Continue Omega 3 supplement on a daily basis. 3. Continue Black Seed Oil ( Nigella Sativa) 1 capsule daily. 4. Continue Vitamin D3 at 1000 units daily. 5. Continue Meclizine at 25 mg daily as needed. 6. Continue Magnesium Oxide at 250 mg at night. 7. Continue Turmeric at 450 mg daily. 8. Continue Co Q 10 at 300 mg daily. 9. Continue follow up with Dr. Janay Ridley, Cardiology. 10. Continue Florinef which is being managed by Dr. Janay Ridley. 11. Continue follow up with ENT. 12. I would like her to take foods that promote anti inflammation in her body. 13. I would like to see her back in 6 months or earlier if needed.

## 2021-01-21 NOTE — PROGRESS NOTES
SUBJECTIVE:   It was a pleasure to see Yeni Phillips in the Pediatric Neurology Clinic at [facility]. She is a 21 y.o. female presenting to this Virtual visit for a follow up. INTERIM PROGRESS:   HEADACHES:  Geoff Esquivel reports that the intermittent headaches continue to persist. She states that she is experiencing headaches once a week. Her most recent headache occurred on 1/18/21. Geoff Esquivel took Tylenol which provided relief. She reports the headache lasted 3 hours. In the past Geoff Esquivel reported that she experienced approximately 2-3 headaches per week. She is currently taking Turmeric, Vitamin C,  Omega 3, Vitamin D,  Magnesium Oxide, Co Q 10 and Trokendi XR in this regard, without any reports of side effects or concerns. Headache description provided below:    HEADACHE DESCRIPTION:    These headaches are of a high intensity, occurring in different areas depending on the day. She is not able to do her daily activities and this does result in interruption of school or other activities at home. There is associated light or sound sensitivity with some headaches, but no neurological deficits with headaches. Such a headache would usually last 2 hours. She states that she will take Ibuprofen and this seems to provide relief. MIGRAINES:  Geoff Esquivel reports that the migraines continue to persist. She states that she has experienced approximately 3 migraines since the last visit in August 2020. Her most recent migraine occurred December 2020. She reports that she took Tylenol and laid down in a dark, quiet room. She denies any light, sound sensitivity, nausea or vomiting. However, she states that light, sound sensitivity, nausea and vomiting accompanies most of her migraines. She will take Tylenol and lay in a dark, quiet room and relief will be provided. Migraine description provided below:     MIGRAINE DESCRIPTION;  They describe the pain to occur in different parts of the head depending on the day. She states that the high intensity. Prior to the migraine, the child would have visual aura consisting of seeing flashing lights. Nausea or vomiting Sometimes accompanies the migraines. The child will prefer to go and sleep in a dark, quiet room. There is associated tingling in her head  with these migraines. Ibuprofen seems to provide some relief. HEARING LOSS:    Ricki Cheema reports her hearing loss continues to  fluctuate. She states that her hearing will fluctuate between ears, which ear she will hear out of the best. This  remains unchanged from the last visit. She continues to experience ringing in bilateral ears. It is to be recalled, that Ricki Cheema has followed up with ENT in this regard; however, there were no further recommendations. In the past, Ricki Cheema completed  genetic studies and chromosomal microadenoma as well as multiple other tests without any clear etiology. DIZZINESS:   Ricki Cheema reports that the intermittent dizziness continues to persist. She states that in the past month and a half she has not experienced any vertigo episodes. She states that her most recent episode occurred in November 2020. She reports that is lasted  2 to 5 minutes in duration. She states she was sitting down at the time. However, positional changes is a trigger for the dizziness. It is to be recalled, that in the past Ricki Cheema reported that the dizziness would occur randomly, twice a month and would last up to 20 minutes in duration. There are no reports of syncopal episodes. She is currently followed by Cardiology, Dr. Jessy Ta in this regard. She continues to take Florinef in this regard, which is also managed by Dr. Jessy Ta. REVIEW OF SYSTEMS:  Constitutional: Negative. Eyes: Negative. Respiratory: Negative. Cardiovascular: Negative. Gastrointestinal: Negative. Genitourinary: Negative. Musculoskeletal: Negative    Skin: Negative.    Neurological: positive for headaches and dizziness, negative for seizures, negative for developmental delays. Hematological: Negative. Psychiatric/Behavioral: negative for behavioral issues, negative for ADHD     All other systems reviewed and are negative. Past, social, family, and developmental history was reviewed and unchanged. OBJECTIVE:   PHYSICAL EXAM  There were no vitals taken for this visit. Constitutional: [x] Appears well-developed and well-nourished [x] No apparent distress      [] Abnormal-   Mental status  [x] Alert and awake  [x] Oriented to person/place/time [x]Able to follow commands      Eyes:  EOM    [x]  Normal  [] Abnormal-  Sclera  [x]  Normal  [] Abnormal -         Discharge [x]  None visible  [] Abnormal -    HENT:   [x] Normocephalic, atraumatic. [] Abnormal   [x] Mouth/Throat: Mucous membranes are moist.     External Ears [x] Normal  [] Abnormal-     Neck: [x] No visualized mass     Pulmonary/Chest: [x] Respiratory effort normal.  [x] No visualized signs of difficulty breathing or respiratory distress        [] Abnormal-      Musculoskeletal:   [x] Normal gait with no signs of ataxia         [x] Normal range of motion of neck        [] Abnormal-     Neurological:        [x] No Facial Asymmetry (Cranial nerve 7 motor function) (limited exam to video visit)          [x] No gaze palsy        [] Abnormal-         Skin:        [x] No significant exanthematous lesions or discoloration noted on facial skin         [] Abnormal-            Psychiatric:       [x] Normal Affect [] No Hallucinations        [] Abnormal-     RECORD REVIEW: Previous medical records were reviewed at today's visit.     DIAGNOSTIC STUDIES:  10/17/2017 - EEG - Normal   11/4/2017 - MRI Brain - Normal   June 2016 - CBC - NORMAL, electrolytes - normal, TSH- normal, SORAIDA screen - negative, CRP 0.053, ESR- 18  MRI of the brain in October 2011 was reported as normal.  MRI of the brain in June 2013 reported Small focus of T2 hyperintensity Measuring 5 mm in the left centrum semiovale of unexplained etiology. Ref. Range 7/17/2019 10:05   Sodium Latest Ref Range: 135 - 144 mmol/L 139   Potassium Latest Ref Range: 3.7 - 5.3 mmol/L 4.2   Chloride Latest Ref Range: 98 - 107 mmol/L 102   CO2 Latest Ref Range: 20 - 31 mmol/L 24   BUN Latest Ref Range: 6 - 20 mg/dL 15   Creatinine Latest Ref Range: 0.50 - 0.90 mg/dL 0.67   Anion Gap Latest Ref Range: 9 - 17 mmol/L 13   Glucose Latest Ref Range: 70 - 99 mg/dL 100 (H)   Calcium Latest Ref Range: 8.6 - 10.4 mg/dL 9.4   Total Protein Latest Ref Range: 6.4 - 8.3 g/dL 8.1   Chol/HDL Ratio Latest Ref Range: <5  3.0   Cholesterol Latest Ref Range: <200 mg/dL 156   HDL Cholesterol Latest Ref Range: >40 mg/dL 52   LDL Cholesterol Latest Ref Range: 0 - 130 mg/dL 95   Triglycerides Latest Ref Range: <150 mg/dL 46   Albumin Latest Ref Range: 3.5 - 5.2 g/dL 4.3   Alk Phos Latest Ref Range: 35 - 104 U/L 75   ALT Latest Ref Range: 5 - 33 U/L 22   AST Latest Ref Range: <32 U/L 19   Bilirubin Latest Ref Range: 0.3 - 1.2 mg/dL 0.32   TSH Latest Ref Range: 0.30 - 5.00 mIU/L 1.24   WBC Latest Ref Range: 4.5 - 13.5 k/uL 9.0   RBC Latest Ref Range: 4.0 - 5.2 m/uL 4.80   Hemoglobin Quant Latest Ref Range: 12.0 - 16.0 g/dL 13.0   Hematocrit Latest Ref Range: 36 - 46 % 39.7   Platelet Count Latest Ref Range: 150 - 450 k/uL 465 (H)        Ref. Range 7/31/2020 11:15   Glucose, Fasting Latest Ref Range: 70 - 99 mg/dL 96   FSH Latest Ref Range: 1.7 - 21.5 U/L 4.5   LH Latest Ref Range: 1.0 - 95.6 U/L 6.6   Prolactin Latest Ref Range: 4.79 - 23.30 ug/L 21.93   Insulin Latest Units: mU/L 24.6   Insulin Comment Unknown FASTING   TSH Latest Ref Range: 0.30 - 5.00 mIU/L 1.00   Thyroxine, Free Latest Ref Range: 0.93 - 1.70 ng/dL 1.23       ASSESSMENT:   Erin Waggoner is a 21 y.o. female with:  1. Generalized headaches which continue to persist.  2. Meniere disease, bilateral which fluctuates in severity. She continues to take Meclizine on a when necessary basis.  Some of her dizziness symptoms are also in relation to the NCS. 3. Migraine variant. 4. Sensorineural hearing loss, etiology which is likely in relation to Ménière's disease. She followed with ENT in the past and is not yet a candidate for a cochlear implant. 5. Abnormal MRI of the brain revealing a small focus of T2 hyperintensity in the left centrum semiovale measuring approximately 5 mm, of unexplained etiology. A subsequent MRI in 2017 was within normal limits. She will need another MRI brain to assess size of this lesion. PLAN:   1. Continue Trokendi  mg daily. 2. Continue Omega 3 supplement on a daily basis. 3. Continue Black Seed Oil ( Nigella Sativa) 1 capsule daily. 4. Continue Vitamin D3 at 1000 units daily. 5. Continue Meclizine at 25 mg daily as needed. 6. Continue Magnesium Oxide at 250 mg at night. 7. Continue Turmeric at 450 mg daily. 8. Continue Co Q 10 at 300 mg daily. 9. She will need another MRI brain to assess size of this lesion. 10. Continue follow up with Dr. Rosa Mims, Cardiology. 11. Continue Florinef which is being managed by Dr. Rosa Mims. 12. Continue follow up with ENT. 13. I would like her to take foods that promote anti inflammation in her body. 14. I would like to see her back in 6 months or earlier if needed. Written by Elicia Pena RN acting as scribe for Dr. Osorio Falcon.   1/21/2021  9:56 AM     I have reviewed and made changes accordingly to the work scribed by Elicia Pena RN. The documentation accurately reflects work and decisions made by me. Donell Obrien MD   Pediatric Neurology & Epilepsy  1/21/2021    Fabby Grove is a 21 y.o. female being evaluated by a Virtual Visit (video visit) encounter to address concerns as mentioned above. A caregiver was present when appropriate.  Due to this being a TeleHealth encounter (During YRNPR-32 public health emergency), evaluation of the following organ systems was limited: Vitals/Constitutional/EENT/Resp/CV/GI//MS/Neuro/Skin/Heme-Lymph-Imm. Pursuant to the emergency declaration under the [address] [place] [address] and the [employer] Act, this Virtual Visit was conducted with patient's (and/or legal guardian's) consent, to reduce the patient's risk of exposure to COVID-19 and provide necessary medical care. The patient (and/or legal guardian) has also been advised to contact this office for worsening conditions or problems, and seek emergency medical treatment and/or call 911 if deemed necessary. Services were provided through a video synchronous discussion virtually to substitute for in-person clinic visit. Patient and provider were located at their individual homes. --Gladys Espitia MD on 1/21/2021 at 1:16 PM    An electronic signature was used to authenticate this note.

## 2021-01-26 RX ORDER — NORETHINDRONE ACETATE AND ETHINYL ESTRADIOL 1.5-30(21)
1 KIT ORAL DAILY
Qty: 1 PACKET | Refills: 6 | Status: SHIPPED | OUTPATIENT
Start: 2021-01-26 | End: 2021-02-16 | Stop reason: SDUPTHER

## 2021-01-28 ENCOUNTER — TELEPHONE (OUTPATIENT)
Dept: PEDIATRIC NEUROLOGY | Age: 21
End: 2021-01-28

## 2021-01-28 ENCOUNTER — HOSPITAL ENCOUNTER (OUTPATIENT)
Dept: MRI IMAGING | Age: 21
Discharge: HOME | End: 2021-01-30
Payer: MEDICARE

## 2021-01-28 DIAGNOSIS — R42 VERTIGO: ICD-10-CM

## 2021-01-28 PROCEDURE — 70553 MRI BRAIN STEM W/O & W/DYE: CPT

## 2021-01-28 PROCEDURE — A9579 GAD-BASE MR CONTRAST NOS,1ML: HCPCS | Performed by: PSYCHIATRY & NEUROLOGY

## 2021-01-28 PROCEDURE — 2580000003 HC RX 258: Performed by: PSYCHIATRY & NEUROLOGY

## 2021-01-28 PROCEDURE — 6360000004 HC RX CONTRAST MEDICATION: Performed by: PSYCHIATRY & NEUROLOGY

## 2021-01-28 PROCEDURE — 70546 MR ANGIOGRAPH HEAD W/O&W/DYE: CPT

## 2021-01-28 RX ORDER — SODIUM CHLORIDE 0.9 % (FLUSH) 0.9 %
10 SYRINGE (ML) INJECTION PRN
Status: DISCONTINUED | OUTPATIENT
Start: 2021-01-28 | End: 2021-01-31 | Stop reason: HOSPADM

## 2021-01-28 RX ADMIN — GADOTERIDOL 20 ML: 279.3 INJECTION, SOLUTION INTRAVENOUS at 07:42

## 2021-01-28 RX ADMIN — Medication 10 ML: at 07:42

## 2021-01-28 NOTE — TELEPHONE ENCOUNTER
----- Message from LAURO Laird CNP sent at 1/28/2021  9:31 AM EST -----  Unchanged nonspecific small focus of T2 FLAIR hyperintensity within the  subcortical white matter of the left parietal lobe. Please notify parents.

## 2021-01-28 NOTE — TELEPHONE ENCOUNTER
----- Message from LAURO Siddiqui CNP sent at 1/28/2021  9:31 AM EST -----  Unchanged nonspecific small focus of T2 FLAIR hyperintensity within the  subcortical white matter of the left parietal lobe. Please notify parents.

## 2021-01-28 NOTE — TELEPHONE ENCOUNTER
Herman Warren returned call and was notified MRI/MRA Unchanged, nonspecific small focus of T2 FLAIR hyperintensity within the subcortical white matter of the left parietal lobe. Herman Whyteer verbalized understanding. No questions or concerns voiced at this time.

## 2021-03-22 RX ORDER — NORETHINDRONE ACETATE AND ETHINYL ESTRADIOL 1.5-30(21)
1 KIT ORAL DAILY
Qty: 1 PACKET | Refills: 4 | OUTPATIENT
Start: 2021-03-22

## 2021-03-29 ENCOUNTER — TELEPHONE (OUTPATIENT)
Dept: FAMILY MEDICINE CLINIC | Age: 21
End: 2021-03-29

## 2021-03-29 NOTE — TELEPHONE ENCOUNTER
Patient states she has been struggling in college and was told she would need documentation of her ADHD to send to the disability office at her college to allow extra time. She states she does not have a psychologist/psychiatrist. Please advise.

## 2021-07-27 RX ORDER — NORETHINDRONE ACETATE AND ETHINYL ESTRADIOL 1.5-30(21)
1 KIT ORAL DAILY
Qty: 1 PACKET | Refills: 0 | Status: SHIPPED | OUTPATIENT
Start: 2021-07-27 | End: 2021-08-04 | Stop reason: SDUPTHER

## 2021-08-04 ENCOUNTER — HOSPITAL ENCOUNTER (OUTPATIENT)
Age: 21
Setting detail: SPECIMEN
Discharge: HOME | End: 2021-08-04
Payer: MEDICARE

## 2021-08-04 ENCOUNTER — OFFICE VISIT (OUTPATIENT)
Dept: OBGYN CLINIC | Age: 21
End: 2021-08-04
Payer: MEDICARE

## 2021-08-04 VITALS
SYSTOLIC BLOOD PRESSURE: 128 MMHG | BODY MASS INDEX: 43.65 KG/M2 | HEART RATE: 100 BPM | DIASTOLIC BLOOD PRESSURE: 88 MMHG | WEIGHT: 231 LBS

## 2021-08-04 DIAGNOSIS — Z11.3 SCREEN FOR STD (SEXUALLY TRANSMITTED DISEASE): Primary | ICD-10-CM

## 2021-08-04 DIAGNOSIS — Z30.41 ENCOUNTER FOR BIRTH CONTROL PILLS MAINTENANCE: ICD-10-CM

## 2021-08-04 DIAGNOSIS — Z01.419 PAP SMEAR, AS PART OF ROUTINE GYNECOLOGICAL EXAMINATION: ICD-10-CM

## 2021-08-04 PROCEDURE — 99395 PREV VISIT EST AGE 18-39: CPT | Performed by: CLINICAL NURSE SPECIALIST

## 2021-08-04 RX ORDER — NORETHINDRONE ACETATE AND ETHINYL ESTRADIOL 1.5-30(21)
1 KIT ORAL DAILY
Qty: 1 PACKET | Refills: 11 | Status: SHIPPED | OUTPATIENT
Start: 2021-08-04 | End: 2022-08-08 | Stop reason: SDUPTHER

## 2021-08-04 SDOH — ECONOMIC STABILITY: FOOD INSECURITY: WITHIN THE PAST 12 MONTHS, THE FOOD YOU BOUGHT JUST DIDN'T LAST AND YOU DIDN'T HAVE MONEY TO GET MORE.: NEVER TRUE

## 2021-08-04 SDOH — ECONOMIC STABILITY: FOOD INSECURITY: WITHIN THE PAST 12 MONTHS, YOU WORRIED THAT YOUR FOOD WOULD RUN OUT BEFORE YOU GOT MONEY TO BUY MORE.: NEVER TRUE

## 2021-08-04 SDOH — ECONOMIC STABILITY: TRANSPORTATION INSECURITY
IN THE PAST 12 MONTHS, HAS LACK OF TRANSPORTATION KEPT YOU FROM MEETINGS, WORK, OR FROM GETTING THINGS NEEDED FOR DAILY LIVING?: NO

## 2021-08-04 SDOH — ECONOMIC STABILITY: TRANSPORTATION INSECURITY
IN THE PAST 12 MONTHS, HAS THE LACK OF TRANSPORTATION KEPT YOU FROM MEDICAL APPOINTMENTS OR FROM GETTING MEDICATIONS?: NO

## 2021-08-04 ASSESSMENT — PATIENT HEALTH QUESTIONNAIRE - PHQ9
SUM OF ALL RESPONSES TO PHQ QUESTIONS 1-9: 0
1. LITTLE INTEREST OR PLEASURE IN DOING THINGS: 0
2. FEELING DOWN, DEPRESSED OR HOPELESS: 0
SUM OF ALL RESPONSES TO PHQ9 QUESTIONS 1 & 2: 0

## 2021-08-04 ASSESSMENT — SOCIAL DETERMINANTS OF HEALTH (SDOH): HOW HARD IS IT FOR YOU TO PAY FOR THE VERY BASICS LIKE FOOD, HOUSING, MEDICAL CARE, AND HEATING?: NOT HARD AT ALL

## 2021-08-04 NOTE — PROGRESS NOTES
[facility] [address] [place] [address] OB GYN  [address] [address]  Dept: [phone]        DATE OF VISIT:  21        History and Physical    Renee Small    :  2000  CHIEF COMPLAINT:    Chief Complaint   Patient presents with    Gynecologic Exam                    Renee Small is a 24 y.o. female who presents for annual well woman exam with pap and STD screening. Patient reports that on Monday while taking a shower she noticed a lump that is on the right side of labia, and denies any pain. The patient was seen and examined. Per the patient bowels areregular. She has no voiding complaints. She denies any bloating as well as vaginal discharge. Chaperone for Intimate Exam   Chaperone was offered as part of the rooming process. Patient declined and agrees to continue with exam without a chaperone.    Chaperone: none     _____________________________________________________________________  Past Medical History:   Diagnosis Date    Anxiety 2019    Class 2 obesity due to excess calories without serious comorbidity with body mass index (BMI) of 37.0 to 37.9 in adult 2019    Headache(784.0)     Meniere syndrome     Mild intermittent asthma 2017    Vision loss 3/29/2016                                                                   Past Surgical History:   Procedure Laterality Date    TYMPANOSTOMY TUBE PLACEMENT Bilateral      Family History   Problem Relation Age of Onset    Asthma Sister     Diabetes Maternal Grandmother     High Blood Pressure Maternal Grandmother     Heart Disease Maternal Grandmother     Asthma Maternal Grandmother     Other Maternal Grandmother         GERSON with CPAP    Asthma Brother      Social History     Tobacco Use   Smoking Status Never Smoker   Smokeless Tobacco Never Used     Social History     Substance and Sexual Activity   Alcohol Use No     Current Outpatient Medications   Medication Sig Dispense Refill    norethindrone-ethinyl estradiol-iron (LOESTRIN FE 1.5/30) 1.5-30 MG-MCG tablet Take 1 tablet by mouth daily 1 packet 11    topiramate ER (TROKENDI XR) 100 MG CP24 Take 1 capsule by mouth daily 30 capsule 6    topiramate ER (TROKENDI XR) 25 MG CP24 Take 1 caps along with the 100 mg capsule 30 capsule 5    magnesium oxide (MAG-OX) 250 MG TABS tablet Take 1 tablet by mouth every evening 30 tablet 6    Turmeric 500 MG TABS Take 450 mg by mouth daily 30 tablet 6    coenzyme Q10 100 MG CAPS capsule Take 2 capsules by mouth every morning 60 capsule 6    Vitamin D (CHOLECALCIFEROL) 25 MCG (1000 UT) TABS tablet Take 1 tablet by mouth daily 30 tablet 6    albuterol sulfate  (90 Base) MCG/ACT inhaler 2 puffs as needed      loratadine (CLARITIN) 10 MG capsule 1 tablet      meclizine (ANTIVERT) 12.5 MG tablet       ibuprofen (ADVIL;MOTRIN) 800 MG tablet Take 1 tablet by mouth every 6 hours as needed for Pain 28 tablet 2    montelukast (SINGULAIR) 10 MG tablet Take 1 tablet by mouth daily 30 tablet 3    vitamin C (ASCORBIC ACID) 500 MG tablet Take 1 tablet by mouth daily 30 tablet 5    albuterol sulfate HFA (VENTOLIN HFA) 108 (90 Base) MCG/ACT inhaler Inhale 2 puffs into the lungs every 6 hours as needed for Wheezing . Take two prophylactic puffs 5-15 min prior to exercise. 1 Inhaler 1    ondansetron (ZOFRAN) 4 MG tablet TAKE 1 TABLET BY MOUTH DAILY AS NEEDED FOR NAUSEA OR VOMITING 30 tablet 0    cyproheptadine (PERIACTIN) 4 MG tablet TAKE 1 TABLET BY MOUTH EVERY NIGHT 30 tablet 0    fluticasone (FLONASE) 50 MCG/ACT nasal spray SHAKE LIQUID AND USE 2 SPRAYS IN EACH NOSTRIL EVERY DAY 1 Bottle 5    fludrocortisone (FLORINEF) 0.1 MG tablet Take 0.1 mg by mouth      Multiple Vitamins-Minerals (CENTROVITE) TABS Take 1 tablet by mouth daily 30 tablet 11     No current facility-administered medications for this visit. Allergies:   Allergies   Allergen Reactions    Seasonal Other (See Comments)  Triamterene Itching     Redness         Gynecologic History:  Patient's last menstrual period was 07/25/2021 (approximate). Sexually Active: No  STD History:No  Birth Control: Yes pill working well    OB History   No obstetric history on file.     ______________________________________________________________________    Review of Systems    REVIEW OFSYSTEMS:        Constitutional:  Unexpected weight change, extreme fatigue, night sweats              no  Skin:                           Rashes, moles   no  Neurological:  Frequentheadaches often 2 X per week, seizures         yes  Ophthalmic:  Recent visual changes no  ENT:   Difficulty swallowing  no  Breast:              Masses, pain, nipple discharge                            no     Respiratory:  Shortness of breath, coughing           no    Cardiovascular: Chest pain   no     Gastrointestinal: Chronic diarrhea/constipation, nausea/vomiting           no   Urogenital:  Urinary incontinence, frequency, urgency          no                                         Heavy/irregular periods           no                                      Vaginal discharge                   no  Hematological: Bruises easy   no     Endocrine:  Hot flashes   no     Hot/Cold Intolerance  no    Psychological:            Mood and affect were within normal limits. no                 Physical Exam    Physical Exam:    Vitals:    08/04/21 1423 08/04/21 1445   BP: (!) 132/92 128/88   Pulse: 100    Weight: 231 lb (104.8 kg)        General Appearance: This  is a well developed, well nourished, well groomed female. Her BMI was reviewed. Nutritional decision making andexercise were discussed. Neurological:  The patient is alert and oriented to time,place, person, and situation    Skin:  A brief inspection of the skin revealed no rashes or lesions. Neck:  The neck was supple. Respiratory: There was unlabored respiratory effort.  Lungs clear to ascultation. Cardiovascular: The patients extremities were without calf tenderness or edema. Heart with a regular rate and rhythm. Abdomen: The abdomen was soft and non-tender with no guarding, rebound or rigidity. No hernias were appreciated. Breast:   The patients breasts were symmetrical.  There were no masses, discharge or retractions noted. Self breast exams were reviewed. Pelvic Exam:  The external genitalia was with a normal appearance. The vaginal vault was normal. There were no cystocele, rectocele, or enterocele appreciated. There was no vaginal discharge. The cervix was without lesions. There was no cervical motion tenderness. The uterus was mobile, midline and regular. The adnexa no fullness, tenderness or masses appreciated. ASSESSMENT:     Normal annual well woman exam    24 y.o. Female; Annual   Diagnosis Orders   1. Screen for STD (sexually transmitted disease)  C.trachomatis N.gonorrhoeae DNA    VAGINITIS DNA PROBE   2. Pap smear, as part of routine gynecological examination  PAP SMEAR   3. Encounter for birth control pills maintenance  norethindrone-ethinyl estradiol-iron (LOESTRIN FE 1.5/30) 1.5-30 MG-MCG tablet                     PLAN:  - Pap collected. Discussed new papsmear guidelines. - Birth control Discussed. - Smoking risk factors Discussed  - Diet and exercise reviewed. - Routine healthmaintenance per patients PCP.  - Return to clinic in 1 year or earlier with questions, problems, concerns. Return for 1 year for Annual and as needed.         Electronically signed by LAURO Mcnamara CNP on 8/4/2021 at 2:53 PM

## 2021-08-05 DIAGNOSIS — Z11.3 SCREEN FOR STD (SEXUALLY TRANSMITTED DISEASE): ICD-10-CM

## 2021-08-05 LAB
C TRACH DNA GENITAL QL NAA+PROBE: NEGATIVE
DIRECT EXAM: NORMAL
Lab: NORMAL
N. GONORRHOEAE DNA: NEGATIVE
SPECIMEN DESCRIPTION: NORMAL
SPECIMEN DESCRIPTION: NORMAL

## 2021-08-06 DIAGNOSIS — J45.990 EXERCISE-INDUCED ASTHMA: ICD-10-CM

## 2021-08-06 NOTE — TELEPHONE ENCOUNTER
Spoke with patient and offered her Wednesday with kane but she was unable as she had another appointment. She is going to go to walk in to get physical but still needs refill on inhaler.

## 2021-08-06 NOTE — TELEPHONE ENCOUNTER
Hi Dr Nayana Roth, would you be ok with us squeezing this patient in on Monday for a physical at 930 am?

## 2021-08-06 NOTE — TELEPHONE ENCOUNTER
----- Message from [place], [place] sent at 8/6/2021  2:23 PM EDT -----  Subject: Referral Request    QUESTIONS   Reason for referral request? Pt requesting audiology referral to [facility]   ENT (on [facility]) pt wears hearing aid in each ear. Has the physician seen you for this condition before? No   Preferred Specialist (if applicable)? Do you already have an appointment scheduled? Yes  Select Scheduled Date? 2021-08-11  Select Scheduled Physician? Outside [address] ENT on [facility]  Additional Information for Provider? Patient would like a confirmation   call back once referral made please.  ---------------------------------------------------------------------------  --------------  CALL BACK INFO  What is the best way for the office to contact you? OK to leave message on   [employer]  Preferred Call Back Phone Number?  [phone]

## 2021-08-06 NOTE — TELEPHONE ENCOUNTER
----- Message from Gema Fall sent at 2021  2:33 PM EDT -----  Subject: Refill Request    QUESTIONS  Name of Medication? albuterol sulfate HFA (VENTOLIN HFA) 108 (90 Base)   MCG/ACT inhaler  Patient-reported dosage and instructions? Puff twice when needed, don't   typically need to use it to workout  How many days do you have left? 0  [id number]  Pharmacy phone number (if available)? [phone]  Additional Information for Provider? Patient only has an  rescue   inhaler left, leaving to return to college in [place] needs prior to 21.  ---------------------------------------------------------------------------  --------------  CALL BACK INFO  What is the best way for the office to contact you? OK to leave message on   voicemail  Preferred Call Back Phone Number?  [phone]

## 2021-08-06 NOTE — TELEPHONE ENCOUNTER
----- Message from [place], [place] sent at 8/6/2021  2:30 PM EDT -----  Subject: Appointment Request    Reason for Call: Routine Physical Exam    QUESTIONS  Type of Appointment? Established Patient  Reason for appointment request? Available appointments did not meet   patient need  Additional Information for Provider? Patient leaving for [facility] and   requires annual physical (without PAP) prior to 8/18/21. No available   appts for PCP ofc showing.  ---------------------------------------------------------------------------  --------------  CALL BACK INFO  What is the best way for the office to contact you? OK to leave message on   voicemail  Preferred Call Back Phone Number? [phone]  ---------------------------------------------------------------------------  --------------  SCRIPT ANSWERS  Relationship to Patient? Self  (If the patient has Medicare as their primary insurance coverage ask this   question) Are you requesting a Medicare Annual Wellness Visit? No  (Is the patient requesting a pap smear with their physical exam?)? No  (Is the patient requesting their annual physical and does not need PAP or   AWV per above?)? Yes   Have you been diagnosed with, awaiting test results for, or told that you   are suspected of having COVID-19 (Coronavirus)? (If patient has tested   negative or was tested as a requirement for work, school, or travel and   not based on symptoms, answer no)? No  Do you currently have flu-like symptoms including fever or chills, cough,   shortness of breath, difficulty breathing, or new loss of taste or smell? No  Have you had close contact with someone with COVID-19 in the last 14 days? No  (Service Expert  click yes below to proceed with [employer] As Usual   Scheduling)?  Yes

## 2021-08-06 NOTE — TELEPHONE ENCOUNTER
----- Message from Gema Fernandez sent at 8/6/2021  2:30 PM EDT -----  Subject: Appointment Request    Reason for Call: Routine Physical Exam    QUESTIONS  Type of Appointment? Established Patient  Reason for appointment request? Available appointments did not meet   patient need  Additional Information for Provider? Patient leaving for [facility] and   requires annual physical (without PAP) prior to 8/18/21. No available   appts for PCP ofc showing.  ---------------------------------------------------------------------------  --------------  CALL BACK INFO  What is the best way for the office to contact you? OK to leave message on   voicemail  Preferred Call Back Phone Number? [phone]  ---------------------------------------------------------------------------  --------------  SCRIPT ANSWERS  Relationship to Patient? Self  (If the patient has Medicare as their primary insurance coverage ask this   question) Are you requesting a Medicare Annual Wellness Visit? No  (Is the patient requesting a pap smear with their physical exam?)? No  (Is the patient requesting their annual physical and does not need PAP or   AWV per above?)? Yes   Have you been diagnosed with, awaiting test results for, or told that you   are suspected of having COVID-19 (Coronavirus)? (If patient has tested   negative or was tested as a requirement for work, school, or travel and   not based on symptoms, answer no)? No  Do you currently have flu-like symptoms including fever or chills, cough,   shortness of breath, difficulty breathing, or new loss of taste or smell? No  Have you had close contact with someone with COVID-19 in the last 14 days? No  (Service Expert  click yes below to proceed with [employer] As Usual   Scheduling)?  Yes

## 2021-08-09 ENCOUNTER — TELEPHONE (OUTPATIENT)
Dept: FAMILY MEDICINE CLINIC | Age: 21
End: 2021-08-09

## 2021-08-09 ENCOUNTER — E-VISIT (OUTPATIENT)
Dept: FAMILY MEDICINE CLINIC | Age: 21
End: 2021-08-09
Payer: MEDICARE

## 2021-08-09 ENCOUNTER — TELEPHONE (OUTPATIENT)
Dept: PEDIATRIC NEUROLOGY | Age: 21
End: 2021-08-09

## 2021-08-09 DIAGNOSIS — H93.13 TINNITUS OF BOTH EARS: Primary | ICD-10-CM

## 2021-08-09 PROCEDURE — 99423 OL DIG E/M SVC 21+ MIN: CPT | Performed by: FAMILY MEDICINE

## 2021-08-09 RX ORDER — ALBUTEROL SULFATE 90 UG/1
2 AEROSOL, METERED RESPIRATORY (INHALATION) EVERY 6 HOURS PRN
Qty: 1 INHALER | Refills: 1 | Status: SHIPPED | OUTPATIENT
Start: 2021-08-09

## 2021-08-09 ASSESSMENT — LIFESTYLE VARIABLES: SMOKING_STATUS: NO, I'VE NEVER SMOKED

## 2021-08-09 NOTE — PROGRESS NOTES
HPI: per patient's questionnaire    EXAM: not applicable    Diagnoses and all orders for this visit:    1. Tinnitus of both ears    - Nicol Raymond MD, Otolaryngology, [facility] This Encounter   Procedures   Britney Figueredo MD, Otolaryngology, [facility]     Referral Priority:   Routine     Referral Type:   Eval and Treat     Referral Reason:   Specialty Services Required     Referred to Provider:   Tomasz Thompson MD     Requested Specialty:   Otolaryngology     Number of Visits Requested:   1         Patient was advised to contact PCP if symptoms worsen or failing to change as expected        11-20 minutes were spent on the digital evaluation and management of this patient.

## 2021-08-09 NOTE — TELEPHONE ENCOUNTER
----- Message from Caro Nut All sent at 8/9/2021  4:05 PM EDT -----  Subject: Appointment Request    Reason for Call: Routine (Patient Request) No Script    QUESTIONS  Type of Appointment? Established Patient  Reason for appointment request? Available appointments did not meet   patient need  Additional Information for Provider? pt wants to schedule an E-visit to   get her referral to see an audiologist approved but she wants this done as   soon as possible. Tried scheduling but the available time did not meet   pt's reference. ---------------------------------------------------------------------------  --------------  Viky RODRIGUEZ  What is the best way for the office to contact you? OK to leave message on   voicemail  Preferred Call Back Phone Number? [phone]  ---------------------------------------------------------------------------  --------------  SCRIPT ANSWERS  Relationship to Patient? Self  (Is the patient requesting to see the provider for a procedure?)? No  (Is the patient requesting to see the provider urgently  today or   tomorrow. )? No  Have you been diagnosed with, awaiting test results for, or told that you   are suspected of having COVID-19 (Coronavirus)? (If patient has tested   negative or was tested as a requirement for work, school, or travel and   not based on symptoms, answer no)? No  Do you currently have flu-like symptoms including fever or chills, cough,   shortness of breath, difficulty breathing, or new loss of taste or smell? No  Have you had close contact with someone with COVID-19 in the last 14 days? No  (Service Expert  click yes below to proceed with [employer] As Usual   Scheduling)?  Yes

## 2021-08-09 NOTE — TELEPHONE ENCOUNTER
----- Message from Marian Chisholm sent at 8/9/2021  2:53 PM EDT -----  Subject: Message to Provider    QUESTIONS  Information for Provider? Patient is calling to see if her referral   request has been filled, her appointment has already been scheduled for   8/18/2021 and she needs it to be seen. ---------------------------------------------------------------------------  --------------  Eileen RODRIGUEZ  What is the best way for the office to contact you? OK to leave message on   voicemail  Preferred Call Back Phone Number? [phone]  ---------------------------------------------------------------------------  --------------  SCRIPT ANSWERS  Relationship to Patient?  Self

## 2021-08-11 ENCOUNTER — TELEPHONE (OUTPATIENT)
Dept: PEDIATRIC NEUROLOGY | Age: 21
End: 2021-08-11

## 2021-08-11 NOTE — TELEPHONE ENCOUNTER
Tyra denied through insurance and [place]. Patient has not tried two preferred medications. See denial scanned in media. Do we need to change medication?

## 2021-08-12 NOTE — TELEPHONE ENCOUNTER
She can be placed on topamax 50 mg in the morning and 75 mg at night. Or she can try to use a trokendi copay card.   Please discuss with patient

## 2021-08-13 RX ORDER — TOPIRAMATE 50 MG/1
TABLET, FILM COATED ORAL
Qty: 75 TABLET | Refills: 3 | Status: SHIPPED | OUTPATIENT
Start: 2021-08-13

## 2021-08-13 RX ORDER — TOPIRAMATE 50 MG/1
50 TABLET, FILM COATED ORAL 2 TIMES DAILY
Qty: 75 TABLET | Refills: 3 | Status: CANCELLED | OUTPATIENT
Start: 2021-08-13

## 2021-08-17 DIAGNOSIS — Z30.41 ENCOUNTER FOR BIRTH CONTROL PILLS MAINTENANCE: ICD-10-CM

## 2021-08-17 RX ORDER — NORETHINDRONE ACETATE AND ETHINYL ESTRADIOL 1.5-30(21)
1 KIT ORAL DAILY
Qty: 1 PACKET | Refills: 11 | OUTPATIENT
Start: 2021-08-17

## 2021-08-18 ENCOUNTER — VIRTUAL VISIT (OUTPATIENT)
Dept: PEDIATRIC NEUROLOGY | Age: 21
End: 2021-08-18
Payer: MEDICARE

## 2021-08-18 DIAGNOSIS — G43.809 MIGRAINE VARIANT: ICD-10-CM

## 2021-08-18 DIAGNOSIS — R42 VERTIGO: ICD-10-CM

## 2021-08-18 DIAGNOSIS — G43.009 MIGRAINE WITHOUT AURA AND WITHOUT STATUS MIGRAINOSUS, NOT INTRACTABLE: ICD-10-CM

## 2021-08-18 DIAGNOSIS — R51.9 CHRONIC INTRACTABLE HEADACHE, UNSPECIFIED HEADACHE TYPE: Primary | ICD-10-CM

## 2021-08-18 DIAGNOSIS — G89.29 CHRONIC INTRACTABLE HEADACHE, UNSPECIFIED HEADACHE TYPE: Primary | ICD-10-CM

## 2021-08-18 DIAGNOSIS — R55 NEUROCARDIOGENIC SYNCOPE: ICD-10-CM

## 2021-08-18 PROCEDURE — G8427 DOCREV CUR MEDS BY ELIG CLIN: HCPCS | Performed by: PSYCHIATRY & NEUROLOGY

## 2021-08-18 PROCEDURE — 99214 OFFICE O/P EST MOD 30 MIN: CPT | Performed by: PSYCHIATRY & NEUROLOGY

## 2021-08-18 RX ORDER — PNV NO.95/FERROUS FUM/FOLIC AC 28MG-0.8MG
250 TABLET ORAL EVERY EVENING
Qty: 30 TABLET | Refills: 6 | Status: SHIPPED | OUTPATIENT
Start: 2021-08-18 | End: 2022-03-31 | Stop reason: SDUPTHER

## 2021-08-18 RX ORDER — VITAMIN B COMPLEX
1000 TABLET ORAL DAILY
Qty: 30 TABLET | Refills: 6 | Status: SHIPPED | OUTPATIENT
Start: 2021-08-18 | End: 2022-03-31 | Stop reason: SDUPTHER

## 2021-08-18 RX ORDER — TURMERIC ROOT EXTRACT 500 MG
450 TABLET ORAL DAILY
Qty: 30 TABLET | Refills: 6 | Status: SHIPPED | OUTPATIENT
Start: 2021-08-18 | End: 2022-03-31 | Stop reason: SDUPTHER

## 2021-08-18 RX ORDER — UBIDECARENONE 100 MG
200 CAPSULE ORAL EVERY MORNING
Qty: 60 CAPSULE | Refills: 6 | Status: SHIPPED | OUTPATIENT
Start: 2021-08-18 | End: 2022-03-31 | Stop reason: SDUPTHER

## 2021-08-18 NOTE — PATIENT INSTRUCTIONS
PLAN:   1. She is off Trokendi  mg daily for now due to insurance non approval. I will consider TPX in future if needed. .   2. Continue Omega 3 supplement on a daily basis. 3. Continue Black Seed Oil ( Nigella Sativa) 1 capsule daily. 4. Continue Vitamin D3 at 1000 units daily. 5. Continue Meclizine at 25 mg daily as needed. 6. Continue Magnesium Oxide at 250 mg at night. 7. Continue Turmeric at 450 mg daily. 8. Continue Co Q 10 at 300 mg daily. 9. Continue follow up with Dr. Jason Wiley, Cardiology. 10. Continue Florinef which is being managed by Dr. Jason Wiley. 11. Continue follow up with ENT. 12. I would like her to take foods that promote anti inflammation in her body. 13. I would like to see her back in 6 weeks or earlier if needed.

## 2021-08-18 NOTE — PROGRESS NOTES
SUBJECTIVE:   It was a pleasure to see Ange Aguero in the Pediatric Neurology Clinic at [facility]. She is a 24 y.o. female presenting to this Virtual visit for a follow up. INTERIM PROGRESS:   HEADACHES:  Yeison Rodriguez reports that this month her headache has been less. She had 3 headache this month none of them were typical for her migraine. No migraine episodes this month. She took ibuprofen for them. They last for 1 to 3 hours. Last month she had 7 headache episodes and 2 of them were migrainous. Last headache was last week. Tylenol doesn't work most of the time. She occasionally takes ibuprofen which helps most of the time. . In the past Yeison Rodriguez reported that she experienced approximately 2-3 headaches per week. She is currently taking Turmeric, Vitamin C,  Omega 3, Vitamin D,  Magnesium Oxide, Co Q 10 without any reports of side effects or concerns. She doesn't take Trokendi. Headache description provided below:    HEADACHE DESCRIPTION:    These headaches are of a high intensity, occurring in different areas depending on the day. She is not able to do her daily activities and this does result in interruption of school or other activities at home. There is associated light or sound sensitivity with some headaches, but no neurological deficits with headaches. Such a headache would usually last 2 hours. She states that she will take Ibuprofen and this seems to provide relief. MIGRAINES:  Last migraine episode was last month. Since last visit in January she had migraine about once a month. Her most recent migraine was last month. She gets severe nausea and she took Zofran which was given to her by her grandmother. She reports photophobia and photophobia. She occasionally vomits as well. Ibuprofen occasionally works fort her migraines. She takes Tylenol as well.  Migraine description provided below:     MIGRAINE DESCRIPTION;  They describe the pain to occur in different parts of the head depending on the day. She states that the high intensity. Prior to the migraine, the child would have visual aura consisting of seeing flashing lights. Nausea or vomiting Sometimes accompanies the migraines. The child will prefer to go and sleep in a dark, quiet room. There is associated tingling in her head  with these migraines. Ibuprofen seems to provide some relief. HEARING LOSS:  Janki Bhatti reports that she couldn't make an appointment with the audiologist. Hearing loss has been stable. She states that her hearing will fluctuate between ears, which ear magiu reports will hear out of the best. This  remains unchanged from the last visit. She continues to experience ringing in bilateral ears. It is to be recalled, that Janki Bhatti has followed up with ENT in this regard; however, there were no further recommendations. In the past, Janki Bhatti completed  genetic studies and chromosomal microarray as well as multiple other tests without any clear etiology. DIZZINESS:   Janki Bhatti reports that the dizziness has been stable for the last few months. Yesterday she had a short spell in the car that lasted for 20 seconds. Before that she didn't have any episodes for the last 3 months. She states she was sitting down at the time. However, positional changes is a trigger for the dizziness. It is to be recalled, that in the past Janki Bhatti reported that the dizziness would occur randomly, twice a month and would last up to 20 minutes in duration. There are no reports of syncopal episodes. She is currently followed by Cardiology, Dr. Susana Rodriguez in this regard. She continues to take Florinef in this regard, which is also managed by Dr. Susana Rodriguez. Abnormal MRI: Abnormal MRI of the brain revealing a small focus of T2 hyperintensity in the left centrum semiovale measuring approximately 5 mm, of unexplained etiology. A subsequent MRI in 2017 was within normal limits.  Repeat MRI on 1/28/2021 showed stable T2 flair hyperintensity in the 11/4/2017 - MRI Brain - Normal   June 2016 - CBC - NORMAL, electrolytes - normal, TSH- normal, SORAIDA screen - negative, CRP 0.053, ESR- 18  MRI of the brain in October 2011 was reported as normal.  MRI of the brain in June 2013 reported Small focus of T2 hyperintensity Measuring 5 mm in the left centrum semiovale of unexplained etiology. Ref. Range 7/17/2019 10:05   Sodium Latest Ref Range: 135 - 144 mmol/L 139   Potassium Latest Ref Range: 3.7 - 5.3 mmol/L 4.2   Chloride Latest Ref Range: 98 - 107 mmol/L 102   CO2 Latest Ref Range: 20 - 31 mmol/L 24   BUN Latest Ref Range: 6 - 20 mg/dL 15   Creatinine Latest Ref Range: 0.50 - 0.90 mg/dL 0.67   Anion Gap Latest Ref Range: 9 - 17 mmol/L 13   Glucose Latest Ref Range: 70 - 99 mg/dL 100 (H)   Calcium Latest Ref Range: 8.6 - 10.4 mg/dL 9.4   Total Protein Latest Ref Range: 6.4 - 8.3 g/dL 8.1   Chol/HDL Ratio Latest Ref Range: <5  3.0   Cholesterol Latest Ref Range: <200 mg/dL 156   HDL Cholesterol Latest Ref Range: >40 mg/dL 52   LDL Cholesterol Latest Ref Range: 0 - 130 mg/dL 95   Triglycerides Latest Ref Range: <150 mg/dL 46   Albumin Latest Ref Range: 3.5 - 5.2 g/dL 4.3   Alk Phos Latest Ref Range: 35 - 104 U/L 75   ALT Latest Ref Range: 5 - 33 U/L 22   AST Latest Ref Range: <32 U/L 19   Bilirubin Latest Ref Range: 0.3 - 1.2 mg/dL 0.32   TSH Latest Ref Range: 0.30 - 5.00 mIU/L 1.24   WBC Latest Ref Range: 4.5 - 13.5 k/uL 9.0   RBC Latest Ref Range: 4.0 - 5.2 m/uL 4.80   Hemoglobin Quant Latest Ref Range: 12.0 - 16.0 g/dL 13.0   Hematocrit Latest Ref Range: 36 - 46 % 39.7   Platelet Count Latest Ref Range: 150 - 450 k/uL 465 (H)        Ref.  Range 7/31/2020 11:15   Glucose, Fasting Latest Ref Range: 70 - 99 mg/dL 96   FSH Latest Ref Range: 1.7 - 21.5 U/L 4.5   LH Latest Ref Range: 1.0 - 95.6 U/L 6.6   Prolactin Latest Ref Range: 4.79 - 23.30 ug/L 21.93   Insulin Latest Units: mU/L 24.6   Insulin Comment Unknown FASTING   TSH Latest Ref Range: 0.30 - 5.00 mIU/L 1.00   Thyroxine, Free Latest Ref Range: 0.93 - 1.70 ng/dL 1.23       MRI brain w/wo contrast 1/28/2021:   1. Unchanged nonspecific small focus of T2 FLAIR hyperintensity within the subcortical white matter of the left parietal lobe without associated enhancement. 2. Otherwise, unremarkable MRI of the brain. 3. Unremarkable MRA of the head. ASSESSMENT:   Yuliet Rivera is a 24 y.o. female with:  1. Generalized headaches which continue to persist.  2. Meniere disease, bilateral which fluctuates in severity. She continues to take Meclizine on a when necessary basis. Some of her dizziness symptoms are also in relation to the NCS. 3. Migraine variant. 4. Sensorineural hearing loss, etiology which is likely in relation to Ménière's disease. She followed with ENT in the past and is not yet a candidate for a cochlear implant. 5. Abnormal MRI of the brain revealing a small focus of T2 hyperintensity in the left centrum semiovale measuring approximately 5 mm, of unexplained etiology. Unchanged in MRI brain on 1/28/2021. PLAN:   1. She is off Trokendi  mg daily for now due to insurance non approval. I will consider TPX in future if needed. .   2. Continue Omega 3 supplement on a daily basis. 3. Continue Black Seed Oil ( Nigella Sativa) 1 capsule daily. 4. Continue Vitamin D3 at 1000 units daily. 5. Continue Meclizine at 25 mg daily as needed. 6. Continue Magnesium Oxide at 250 mg at night. 7. Continue Turmeric at 450 mg daily. 8. Continue Co Q 10 at 300 mg daily. 9. Continue follow up with Dr. Tess Tobar, Cardiology. 10. Continue Florinef which is being managed by Dr. Tess Tobar. 11. Continue follow up with ENT. 12. I would like her to take foods that promote anti inflammation in her body. 13. I would like to see her back in 6 weeks or earlier if needed. Written by Tristan Andersen RN acting as scribe for Dr. Zak Bowles.    8/18/2021  9:10 AM     I have reviewed and made changes accordingly to the work scribed by Rd Carbajal RN. The documentation accurately reflects work and decisions made by me. Holger Callaway MD   Pediatric Neurology & Epilepsy  8/18/2021    Sudhakar Do is a 24 y.o. female being evaluated by a Virtual Visit (video visit) encounter to address concerns as mentioned above. A caregiver was present when appropriate. Due to this being a TeleHealth encounter (During [facility] public [facility] emergency), evaluation of the following organ systems was limited: Vitals/Constitutional/EENT/Resp/CV/GI//MS/Neuro/Skin/Heme-Lymph-Imm. Pursuant to the emergency declaration under the [address] [place] [address] and the [employer] and [employer] Act, this Virtual Visit was conducted with patient's (and/or legal guardian's) consent, to reduce the patient's risk of exposure to COVID-19 and provide necessary medical care. The patient (and/or legal guardian) has also been advised to contact this office for worsening conditions or problems, and seek emergency medical treatment and/or call 911 if deemed necessary. Services were provided through a video synchronous discussion virtually to substitute for in-person clinic visit. Patient and provider were located at their individual homes. --Daylin Putnam MD on 8/18/2021 at 9:10 AM    An electronic signature was used to authenticate this note.

## 2021-08-18 NOTE — LETTER
[facility]   [address]  [facility], [address]  Phone: [phone]  [phone]        8/18/2021      Xochitl Thompson MD  [address] [facility] [id number]    Patient: Shreyas Roman  YOB: 2000  Date of Visit: 8/18/2021  MRN:  [id number]      Dear Dr. Xochitl Thompson MD      SUBJECTIVE:   It was a pleasure to see Shreyas Roman in the Pediatric Neurology Clinic at [facility]. She is a 24 y.o. female presenting to this Virtual visit for a follow up. INTERIM PROGRESS:   HEADACHES:  Antoinette Sanders reports that this month her headache has been less. She had 3 headache this month none of them were typical for her migraine. No migraine episodes this month. She took ibuprofen for them. They last for 1 to 3 hours. Last month she had 7 headache episodes and 2 of them were migrainous. Last headache was last week. Tylenol doesn't work most of the time. She occasionally takes ibuprofen which helps most of the time. . In the past Antoinette Sanders reported that she experienced approximately 2-3 headaches per week. She is currently taking Turmeric, Vitamin C,  Omega 3, Vitamin D,  Magnesium Oxide, Co Q 10 without any reports of side effects or concerns. She doesn't take Trokendi. Headache description provided below:    HEADACHE DESCRIPTION:    These headaches are of a high intensity, occurring in different areas depending on the day. She is not able to do her daily activities and this does result in interruption of school or other activities at home. There is associated light or sound sensitivity with some headaches, but no neurological deficits with headaches. Such a headache would usually last 2 hours. She states that she will take Ibuprofen and this seems to provide relief. MIGRAINES:  Last migraine episode was last month. Since last visit in January she had migraine about once a month. Her most recent migraine was last month.  She gets severe nausea and she took Zofran which was given to her by her grandmother. She reports photophobia and photophobia. She occasionally vomits as well. Ibuprofen occasionally works fort her migraines. She takes Tylenol as well. Migraine description provided below:     MIGRAINE DESCRIPTION;  They describe the pain to occur in different parts of the head depending on the day. She states that the high intensity. Prior to the migraine, the child would have visual aura consisting of seeing flashing lights. Nausea or vomiting Sometimes accompanies the migraines. The child will prefer to go and sleep in a dark, quiet room. There is associated tingling in her head  with these migraines. Ibuprofen seems to provide some relief. HEARING LOSS:  Neris Vora reports that she couldn't make an appointment with the audiologist. Hearing loss has been stable. She states that her hearing will fluctuate between ears, which ear magui reports will hear out of the best. This  remains unchanged from the last visit. She continues to experience ringing in bilateral ears. It is to be recalled, that Neris Vora has followed up with ENT in this regard; however, there were no further recommendations. In the past, Neris Vora completed  genetic studies and chromosomal microarray as well as multiple other tests without any clear etiology. DIZZINESS:   Neris Vora reports that the dizziness has been stable for the last few months. Yesterday she had a short spell in the car that lasted for 20 seconds. Before that she didn't have any episodes for the last 3 months. She states she was sitting down at the time. However, positional changes is a trigger for the dizziness. It is to be recalled, that in the past Neris Vora reported that the dizziness would occur randomly, twice a month and would last up to 20 minutes in duration. There are no reports of syncopal episodes. She is currently followed by Cardiology, Dr. Nils Hall in this regard.  She continues to take Florinef in this regard, which is also managed by Dr. Sanket Ha. Abnormal MRI: Abnormal MRI of the brain revealing a small focus of T2 hyperintensity in the left centrum semiovale measuring approximately 5 mm, of unexplained etiology. A subsequent MRI in 2017 was within normal limits. Repeat MRI on 1/28/2021 showed stable T2 flair hyperintensity in the same region. MRA was unremarkable       REVIEW OF SYSTEMS:  Constitutional: Negative. Eyes: Negative. Respiratory: Negative. Cardiovascular: Negative. Gastrointestinal: Negative. Genitourinary: Negative. Musculoskeletal: Negative    Skin: Negative. Neurological: positive for headaches and dizziness, negative for seizures, negative for developmental delays. Hematological: Negative. Psychiatric/Behavioral: negative for behavioral issues, negative for ADHD     All other systems reviewed and are negative. Past, social, family, and developmental history was reviewed and unchanged. OBJECTIVE:   PHYSICAL EXAM  LMP 07/25/2021 (Approximate)      Constitutional: [x] Appears well-developed and well-nourished [x] No apparent distress      [] Abnormal-   Mental status  [x] Alert and awake  [x] Oriented to person/place/time [x]Able to follow commands      Eyes:  EOM    [x]  Normal  [] Abnormal-  Sclera  [x]  Normal  [] Abnormal -         Discharge [x]  None visible  [] Abnormal -    HENT:   [x] Normocephalic, atraumatic.   [] Abnormal   [x] Mouth/Throat: Mucous membranes are moist.     External Ears [x] Normal  [] Abnormal-     Neck: [x] No visualized mass     Pulmonary/Chest: [x] Respiratory effort normal.  [x] No visualized signs of difficulty breathing or respiratory distress        [] Abnormal-      Musculoskeletal:   [x] Normal gait with no signs of ataxia         [x] Normal range of motion of neck        [] Abnormal-     Neurological:        [x] No Facial Asymmetry (Cranial nerve 7 motor function) (limited exam to video visit)          [x] No gaze palsy 465 (H)        Ref. Range 7/31/2020 11:15   Glucose, Fasting Latest Ref Range: 70 - 99 mg/dL 96   FSH Latest Ref Range: 1.7 - 21.5 U/L 4.5   LH Latest Ref Range: 1.0 - 95.6 U/L 6.6   Prolactin Latest Ref Range: 4.79 - 23.30 ug/L 21.93   Insulin Latest Units: mU/L 24.6   Insulin Comment Unknown FASTING   TSH Latest Ref Range: 0.30 - 5.00 mIU/L 1.00   Thyroxine, Free Latest Ref Range: 0.93 - 1.70 ng/dL 1.23       MRI brain w/wo contrast 1/28/2021:   1. Unchanged nonspecific small focus of T2 FLAIR hyperintensity within the subcortical white matter of the left parietal lobe without associated enhancement. 2. Otherwise, unremarkable MRI of the brain. 3. Unremarkable MRA of the head. ASSESSMENT:   Ange Aguero is a 24 y.o. female with:  1. Generalized headaches which continue to persist.  2. Meniere disease, bilateral which fluctuates in severity. She continues to take Meclizine on a when necessary basis. Some of her dizziness symptoms are also in relation to the NCS. 3. Migraine variant. 4. Sensorineural hearing loss, etiology which is likely in relation to Ménière's disease. She followed with ENT in the past and is not yet a candidate for a cochlear implant. 5. Abnormal MRI of the brain revealing a small focus of T2 hyperintensity in the left centrum semiovale measuring approximately 5 mm, of unexplained etiology. Unchanged in MRI brain on 1/28/2021. PLAN:   1. She is off Trokendi  mg daily for now due to insurance non approval. I will consider TPX in future if needed. .   2. Continue Omega 3 supplement on a daily basis. 3. Continue Black Seed Oil ( Nigella Sativa) 1 capsule daily. 4. Continue Vitamin D3 at 1000 units daily. 5. Continue Meclizine at 25 mg daily as needed. 6. Continue Magnesium Oxide at 250 mg at night. 7. Continue Turmeric at 450 mg daily. 8. Continue Co Q 10 at 300 mg daily. 9. Continue follow up with Dr. Monroe Lee, Cardiology.    10. Continue Florinef which is being Uma Stewart MD

## 2021-08-19 LAB — CYTOLOGY REPORT: NORMAL

## 2021-09-15 DIAGNOSIS — Z30.41 ENCOUNTER FOR BIRTH CONTROL PILLS MAINTENANCE: ICD-10-CM

## 2021-09-15 RX ORDER — NORETHINDRONE ACETATE AND ETHINYL ESTRADIOL 1.5-30(21)
1 KIT ORAL DAILY
Qty: 1 PACKET | Refills: 11 | OUTPATIENT
Start: 2021-09-15

## 2022-05-05 DIAGNOSIS — Z30.41 ENCOUNTER FOR BIRTH CONTROL PILLS MAINTENANCE: ICD-10-CM

## 2022-05-05 RX ORDER — NORETHINDRONE ACETATE AND ETHINYL ESTRADIOL 1.5-30(21)
1 KIT ORAL DAILY
Qty: 1 PACKET | Refills: 11 | OUTPATIENT
Start: 2022-05-05

## 2022-06-21 ENCOUNTER — OFFICE VISIT (OUTPATIENT)
Dept: FAMILY MEDICINE CLINIC | Age: 22
End: 2022-06-21
Payer: MEDICARE

## 2022-06-21 VITALS
DIASTOLIC BLOOD PRESSURE: 87 MMHG | HEART RATE: 80 BPM | OXYGEN SATURATION: 100 % | SYSTOLIC BLOOD PRESSURE: 137 MMHG | TEMPERATURE: 97.7 F

## 2022-06-21 DIAGNOSIS — H60.331 ACUTE SWIMMER'S EAR OF RIGHT SIDE: Primary | ICD-10-CM

## 2022-06-21 DIAGNOSIS — Q18.1 EAR CYSTS: ICD-10-CM

## 2022-06-21 PROCEDURE — 99203 OFFICE O/P NEW LOW 30 MIN: CPT

## 2022-06-21 RX ORDER — CIPROFLOXACIN AND DEXAMETHASONE 3; 1 MG/ML; MG/ML
4 SUSPENSION/ DROPS AURICULAR (OTIC) 2 TIMES DAILY
Qty: 1 EACH | Refills: 0 | Status: SHIPPED | OUTPATIENT
Start: 2022-06-21 | End: 2022-06-28

## 2022-06-21 ASSESSMENT — PATIENT HEALTH QUESTIONNAIRE - PHQ9
SUM OF ALL RESPONSES TO PHQ QUESTIONS 1-9: 0
SUM OF ALL RESPONSES TO PHQ9 QUESTIONS 1 & 2: 0
SUM OF ALL RESPONSES TO PHQ QUESTIONS 1-9: 0
2. FEELING DOWN, DEPRESSED OR HOPELESS: 0
SUM OF ALL RESPONSES TO PHQ QUESTIONS 1-9: 0
SUM OF ALL RESPONSES TO PHQ QUESTIONS 1-9: 0
1. LITTLE INTEREST OR PLEASURE IN DOING THINGS: 0

## 2022-06-21 ASSESSMENT — ENCOUNTER SYMPTOMS
SHORTNESS OF BREATH: 0
EYE ITCHING: 0
COUGH: 0
RHINORRHEA: 0
VOMITING: 0
DIARRHEA: 0
SORE THROAT: 0
EYE DISCHARGE: 0
ABDOMINAL PAIN: 0

## 2022-06-21 NOTE — PATIENT INSTRUCTIONS
Patient Education        Swimmer's Ear: Care Instructions  Your Care Instructions     Swimmer's ear (otitis externa) is inflammation or infection of the ear canal. This is the passage that leads from the outer ear to the eardrum. Any water, sand, or other debris that gets into the ear canal and stays there can cause swimmer's ear. Putting cotton swabs or other items in the ear to clean it canalso cause this problem. Swimmer's ear can be very painful. But you can treat the pain and infectionwith medicines. You should feel better in a few days. Follow-up care is a key part of your treatment and safety. Be sure to make and go to all appointments, and call your doctor if you are having problems. It's also a good idea to know your test results and keep alist of the medicines you take. How can you care for yourself at home? Cleaning and care   Use antibiotic drops as your doctor directs.  Do not insert ear drops (other than the antibiotic ear drops) or anything else into the ear unless your doctor has told you to.  Avoid getting water in the ear until the problem clears up. Use cotton lightly coated with petroleum jelly as an earplug. Do not use plastic earplugs.  Use a hair dryer set on low to carefully dry the ear after you shower.  To ease ear pain, hold a warm washcloth against your ear.  Take pain medicines exactly as directed. ? If the doctor gave you a prescription medicine for pain, take it as prescribed. ? If you are not taking a prescription pain medicine, ask your doctor if you can take an over-the-counter medicine. Inserting ear drops   Warm the drops to body temperature by rolling the container in your hands. Or you can place it in a cup of warm water for a few minutes.  Lie down, with your ear facing up.  Place drops inside the ear. Follow your doctor's instructions (or the directions on the label) for how many drops to use.  Gently wiggle the outer ear or pull the ear up and back to help the drops get into the ear.  It's important to keep the liquid in the ear canal for 3 to 5 minutes. When should you call for help? Call your doctor now or seek immediate medical care if:     You have a new or higher fever.      You have new or worse pain, swelling, warmth, or redness around or behind your ear.      You have new or increasing pus or blood draining from your ear. Watch closely for changes in your health, and be sure to contact your doctor if:     You are not getting better after 2 days (48 hours). Where can you learn more? Go to [email or web address]://[email or web address]. org and sign in to your [email or web address] account. Enter C706 in the Langtice box to learn more about \"Swimmer's Ear: Care Instructions. \"     If you do not have an account, please click on the \"Sign Up Now\" link. Current as of: September 8, 2021               Content Version: 13.3  © 2006-2022 Healthwise, [facility]. Care instructions adapted under license by [facility] ([facility]). If you have questions about a medical condition or this instruction, always ask your healthcare professional. [place] [address] any warranty or liability for your use of this information.

## 2022-06-21 NOTE — PROGRESS NOTES
[address] [address]  Dept: [phone]  Dept Fax: [phone]    Wm Sanford is a 25 y.o. female who presents to the urgent care today for her medical conditions/complaints as notedbelow. Wm Sanford is c/o of Otalgia (pt stated that she has a bump in her ear and it is painful rt ear )      HPI:     Otalgia   There is pain in the right ear. This is a new problem. The current episode started in the past 7 days. The problem occurs constantly. The problem has been gradually worsening. There has been no fever. The pain is moderate. Pertinent negatives include no abdominal pain, coughing, diarrhea, ear discharge, headaches, hearing loss, neck pain, rash, rhinorrhea, sore throat or vomiting. Treatments tried: warm compress. The treatment provided mild relief. Hx of cyst in ear       Past Medical History:   Diagnosis Date    Anxiety 7/17/2019    Class 2 obesity due to excess calories without serious comorbidity with body mass index (BMI) of 37.0 to 37.9 in adult 7/17/2019    Headache(784.0)     Meniere syndrome     Mild intermittent asthma 8/17/2017    Vision loss 3/29/2016        Current Outpatient Medications   Medication Sig Dispense Refill    ciprofloxacin-dexamethasone (CIPRODEX) 0.3-0.1 % otic suspension Place 4 drops into the right ear 2 times daily for 7 days 1 each 0    mupirocin (BACTROBAN) 2 % ointment Apply topically 3 times daily. 30 g 1    magnesium oxide (MAG-OX) 250 MG TABS tablet Take 1 tablet by mouth every evening 30 tablet 6    Turmeric 500 MG TABS Take 450 mg by mouth daily 30 tablet 6    coenzyme Q10 100 MG CAPS capsule Take 2 capsules by mouth every morning 60 capsule 6    Vitamin D (CHOLECALCIFEROL) 25 MCG (1000 UT) TABS tablet Take 1 tablet by mouth daily 30 tablet 6    topiramate (TOPAMAX) 50 MG tablet Take 50 mg in the morning and 75 mg at night. 75 tablet 3    albuterol sulfate HFA (VENTOLIN HFA) 108 (90 Base) MCG/ACT inhaler Inhale 2 puffs into the lungs every 6 hours as needed for Wheezing . Take two prophylactic puffs 5-15 min prior to exercise. 1 Inhaler 1    norethindrone-ethinyl estradiol-iron (LOESTRIN FE 1.5/30) 1.5-30 MG-MCG tablet Take 1 tablet by mouth daily 1 packet 11    albuterol sulfate  (90 Base) MCG/ACT inhaler 2 puffs as needed      loratadine (CLARITIN) 10 MG capsule 1 tablet      meclizine (ANTIVERT) 12.5 MG tablet       ibuprofen (ADVIL;MOTRIN) 800 MG tablet Take 1 tablet by mouth every 6 hours as needed for Pain 28 tablet 2    montelukast (SINGULAIR) 10 MG tablet Take 1 tablet by mouth daily 30 tablet 3    vitamin C (ASCORBIC ACID) 500 MG tablet Take 1 tablet by mouth daily 30 tablet 5    ondansetron (ZOFRAN) 4 MG tablet TAKE 1 TABLET BY MOUTH DAILY AS NEEDED FOR NAUSEA OR VOMITING 30 tablet 0    cyproheptadine (PERIACTIN) 4 MG tablet TAKE 1 TABLET BY MOUTH EVERY NIGHT 30 tablet 0    fluticasone (FLONASE) 50 MCG/ACT nasal spray SHAKE LIQUID AND USE 2 SPRAYS IN EACH NOSTRIL EVERY DAY 1 Bottle 5    fludrocortisone (FLORINEF) 0.1 MG tablet Take 0.1 mg by mouth      topiramate (TOPAMAX) 50 mg tablet Take 1.5 tablets by mouth 2 times daily (Patient not taking: Reported on 6/21/2022) 95 tablet 3    Galcanezumab-gnlm (EMGALITY) 120 MG/ML SOAJ Inject 1 pen every month (Patient not taking: Reported on 6/21/2022) 1 pen 2    Multiple Vitamins-Minerals (CENTROVITE) TABS Take 1 tablet by mouth daily 30 tablet 11     No current facility-administered medications for this visit. Allergies   Allergen Reactions    Seasonal Other (See Comments)    Triamterene Itching     Redness         Subjective:      Review of Systems   Constitutional: Negative for activity change and appetite change. HENT: Positive for ear pain. Negative for congestion, ear discharge, hearing loss, rhinorrhea and sore throat.     Eyes: Negative for discharge and itching. Respiratory: Negative for cough and shortness of breath. Gastrointestinal: Negative for abdominal pain, diarrhea and vomiting. Genitourinary: Negative for difficulty urinating and dysuria. Musculoskeletal: Negative for neck pain. Skin: Negative for rash. Neurological: Negative for dizziness and headaches. All other systems reviewed and are negative. 14 systems reviewed and negative except as listed in HPI. Objective:     Physical Exam  Vitals reviewed. Constitutional:       General: She is not in acute distress. Appearance: Normal appearance. She is not ill-appearing, toxic-appearing or diaphoretic. HENT:      Head: Normocephalic. Right Ear: Swelling and tenderness present. A middle ear effusion is present. Tympanic membrane is not injected or erythematous. Left Ear: Tympanic membrane normal.      Ears:        Comments: Cyst noted in ear behind tragus     Nose: Nose normal. No congestion or rhinorrhea. Eyes:      General:         Right eye: No discharge. Left eye: No discharge. Conjunctiva/sclera: Conjunctivae normal.   Pulmonary:      Effort: Pulmonary effort is normal.   Musculoskeletal:         General: Normal range of motion. Cervical back: Normal range of motion and neck supple. Skin:     General: Skin is warm and dry. Findings: No abrasion or erythema. Rash is not crusting, macular, pustular or scaling. Comments: Cyst noted in ear behind tragus. Neurological:      Mental Status: She is alert and oriented to person, place, and time. Psychiatric:         Mood and Affect: Mood normal.         Behavior: Behavior normal.         Thought Content: Thought content normal.         Judgment: Judgment normal.       /87   Pulse 80   Temp 97.7 °F (36.5 °C)   SpO2 100%     Assessment:       Diagnosis Orders   1. Acute swimmer's ear of right side  ciprofloxacin-dexamethasone (CIPRODEX) 0.3-0.1 % otic suspension   2. Ear cysts  mupirocin (BACTROBAN) 2 % ointment       Plan:   -Based on the physical exam findings, I believe this is otitis externa.  -Will start ciprodex gtts at this time.  -Keep ears dry. No swimming until issue resolves. -Bactroban ointment for cyst in ear  -Warm compresses as desired for ear pain.  -Follow up with ENT  -Educational materials provided on AVS.  -Follow up if symptoms do not improve/worsen. No follow-ups on file. Orders Placed This Encounter   Medications    ciprofloxacin-dexamethasone (CIPRODEX) 0.3-0.1 % otic suspension     Sig: Place 4 drops into the right ear 2 times daily for 7 days     Dispense:  1 each     Refill:  0    mupirocin (BACTROBAN) 2 % ointment     Sig: Apply topically 3 times daily. Dispense:  30 g     Refill:  1         Patient given educational materials - see patient instructions. Discussed use, benefit, and side effects of prescribed medications. All patient questions answered. Pt voiced understanding.     Electronically signed by LAURO Marlow NP on 6/21/2022 at 10:28 AM

## 2022-08-02 ENCOUNTER — OFFICE VISIT (OUTPATIENT)
Dept: FAMILY MEDICINE CLINIC | Age: 22
End: 2022-08-02
Payer: MEDICARE

## 2022-08-02 VITALS
TEMPERATURE: 98.4 F | BODY MASS INDEX: 46.11 KG/M2 | DIASTOLIC BLOOD PRESSURE: 72 MMHG | WEIGHT: 244.2 LBS | HEIGHT: 61 IN | SYSTOLIC BLOOD PRESSURE: 104 MMHG | RESPIRATION RATE: 16 BRPM | HEART RATE: 99 BPM

## 2022-08-02 DIAGNOSIS — Z11.1 VISIT FOR MANTOUX TEST: ICD-10-CM

## 2022-08-02 DIAGNOSIS — E55.9 VITAMIN D DEFICIENCY: ICD-10-CM

## 2022-08-02 DIAGNOSIS — Z00.00 ANNUAL PHYSICAL EXAM: Primary | ICD-10-CM

## 2022-08-02 PROCEDURE — 99395 PREV VISIT EST AGE 18-39: CPT | Performed by: FAMILY MEDICINE

## 2022-08-02 PROCEDURE — 86580 TB INTRADERMAL TEST: CPT | Performed by: FAMILY MEDICINE

## 2022-08-02 SDOH — HEALTH STABILITY: PHYSICAL HEALTH: ON AVERAGE, HOW MANY MINUTES DO YOU ENGAGE IN EXERCISE AT THIS LEVEL?: 30 MIN

## 2022-08-02 SDOH — HEALTH STABILITY: PHYSICAL HEALTH: ON AVERAGE, HOW MANY DAYS PER WEEK DO YOU ENGAGE IN MODERATE TO STRENUOUS EXERCISE (LIKE A BRISK WALK)?: 1 DAY

## 2022-08-02 SDOH — ECONOMIC STABILITY: FOOD INSECURITY: WITHIN THE PAST 12 MONTHS, THE FOOD YOU BOUGHT JUST DIDN'T LAST AND YOU DIDN'T HAVE MONEY TO GET MORE.: NEVER TRUE

## 2022-08-02 SDOH — ECONOMIC STABILITY: FOOD INSECURITY: WITHIN THE PAST 12 MONTHS, YOU WORRIED THAT YOUR FOOD WOULD RUN OUT BEFORE YOU GOT MONEY TO BUY MORE.: NEVER TRUE

## 2022-08-02 ASSESSMENT — SOCIAL DETERMINANTS OF HEALTH (SDOH)
HOW HARD IS IT FOR YOU TO PAY FOR THE VERY BASICS LIKE FOOD, HOUSING, MEDICAL CARE, AND HEATING?: NOT HARD AT ALL
WITHIN THE LAST YEAR, HAVE TO BEEN RAPED OR FORCED TO HAVE ANY KIND OF SEXUAL ACTIVITY BY YOUR PARTNER OR EX-PARTNER?: NO
WITHIN THE LAST YEAR, HAVE YOU BEEN KICKED, HIT, SLAPPED, OR OTHERWISE PHYSICALLY HURT BY YOUR PARTNER OR EX-PARTNER?: NO
WITHIN THE LAST YEAR, HAVE YOU BEEN HUMILIATED OR EMOTIONALLY ABUSED IN OTHER WAYS BY YOUR PARTNER OR EX-PARTNER?: NO
WITHIN THE LAST YEAR, HAVE YOU BEEN AFRAID OF YOUR PARTNER OR EX-PARTNER?: NO

## 2022-08-02 ASSESSMENT — ENCOUNTER SYMPTOMS
SHORTNESS OF BREATH: 0
ABDOMINAL PAIN: 0
BLOOD IN STOOL: 0
CHEST TIGHTNESS: 0

## 2022-08-02 ASSESSMENT — PATIENT HEALTH QUESTIONNAIRE - PHQ9
1. LITTLE INTEREST OR PLEASURE IN DOING THINGS: 0
2. FEELING DOWN, DEPRESSED OR HOPELESS: 0
SUM OF ALL RESPONSES TO PHQ9 QUESTIONS 1 & 2: 0
SUM OF ALL RESPONSES TO PHQ QUESTIONS 1-9: 0

## 2022-08-02 NOTE — PROGRESS NOTES
Subjective:      Patient ID: Ramakrishna Sky is a 25 y.o. female. HPI  Visit Information    Have you changed or started any medications since your last visit including any over-the-counter medicines, vitamins, or herbal medicines? no   Are you having any side effects from any of your medications? -  no  Have you stopped taking any of your medications? Is so, why? -  no    Have you seen any other physician or provider since your last visit? No  Have you had any other diagnostic tests since your last visit? No  Have you been seen in the emergency room and/or had an admission to a hospital since we last saw you? No  Have you had your routine dental cleaning in the past 6 months? yes     Have you activated your [employer] account? If not, what are your barriers? Yes     Patient Care Team:  Mahin Abraham MD as PCP - General (Family Medicine)  Arleen Howe MD as PCP - [facility] Provider    Medical History Review  Past Medical, Family, and Social History reviewed and does contribute to the patient presenting condition    Health Maintenance   Topic Date Due    Hepatitis C screen  Never done    Chlamydia screen  08/04/2022    Flu vaccine (1) 09/01/2022    Depression Screen  06/21/2023    Pap smear  08/04/2024    DTaP/Tdap/Td vaccine (8 - Td or Tdap) 01/04/2031    Pneumococcal 0-64 years Vaccine (3 - PPSV23 or PCV20) 02/02/2065    Hepatitis A vaccine  Completed    Hepatitis B vaccine  Completed    Hib vaccine  Completed    HPV vaccine  Completed    Varicella vaccine  Completed    Meningococcal (ACWY) vaccine  Completed    COVID-19 Vaccine  Completed    HIV screen  Completed     Patient is a 66-year-old female who presents for initial office visit here for annual physical exam.  She also has a history of chronic headaches and vitamin D deficiency. She states she is taking and tolerating her routine medications. She denies any fever, chills, chest pain, abdominal pain, shortness of breath.   She states that last to person, place, and time. Psychiatric:         Mood and Affect: Mood normal.         Behavior: Behavior normal.       Assessment:       Diagnosis Orders   1. Annual physical exam  Glucose, Random    Lipid Panel    Vitamin D 25 Hydroxy      2. Vitamin D deficiency  Vitamin D 25 Hydroxy              Plan:      Orders Placed This Encounter   Procedures    Glucose, Random     Standing Status:   Future     Standing Expiration Date:   8/2/2023    Lipid Panel     Standing Status:   Future     Standing Expiration Date:   8/2/2023     Order Specific Question:   Is Patient Fasting?/# of Hours     Answer:    Fast 8-10 hours    Vitamin D 25 Hydroxy     Standing Status:   Future     Standing Expiration Date:   8/2/2023      Continue routine medication  Follow-up in 1 year or sooner if needed

## 2022-08-03 ENCOUNTER — HOSPITAL ENCOUNTER (OUTPATIENT)
Age: 22
Setting detail: SPECIMEN
Discharge: HOME | End: 2022-08-03

## 2022-08-03 DIAGNOSIS — Z00.00 ANNUAL PHYSICAL EXAM: ICD-10-CM

## 2022-08-03 DIAGNOSIS — E55.9 VITAMIN D DEFICIENCY: ICD-10-CM

## 2022-08-03 LAB
CHOLESTEROL/HDL RATIO: 4.5
CHOLESTEROL: 189 MG/DL
GLUCOSE BLD-MCNC: 79 MG/DL (ref 70–99)
HDLC SERPL-MCNC: 42 MG/DL
LDL CHOLESTEROL: 135 MG/DL (ref 0–130)
TRIGL SERPL-MCNC: 61 MG/DL

## 2022-08-04 LAB
INDURATION: NORMAL
TB SKIN TEST: NEGATIVE
VITAMIN D 25-HYDROXY: 9 NG/ML

## 2022-08-05 DIAGNOSIS — R51.9 CHRONIC INTRACTABLE HEADACHE, UNSPECIFIED HEADACHE TYPE: ICD-10-CM

## 2022-08-05 DIAGNOSIS — G89.29 CHRONIC INTRACTABLE HEADACHE, UNSPECIFIED HEADACHE TYPE: ICD-10-CM

## 2022-08-05 DIAGNOSIS — G43.719 INTRACTABLE CHRONIC MIGRAINE WITHOUT AURA AND WITHOUT STATUS MIGRAINOSUS: ICD-10-CM

## 2022-08-05 RX ORDER — VITAMIN B COMPLEX
2000 TABLET ORAL DAILY
Qty: 60 TABLET | Refills: 5 | Status: SHIPPED | OUTPATIENT
Start: 2022-08-05

## 2022-08-08 ENCOUNTER — OFFICE VISIT (OUTPATIENT)
Dept: OBGYN CLINIC | Age: 22
End: 2022-08-08
Payer: MEDICARE

## 2022-08-08 VITALS
HEART RATE: 101 BPM | SYSTOLIC BLOOD PRESSURE: 132 MMHG | HEIGHT: 61 IN | DIASTOLIC BLOOD PRESSURE: 86 MMHG | BODY MASS INDEX: 47.01 KG/M2 | WEIGHT: 249 LBS

## 2022-08-08 DIAGNOSIS — Z30.41 ENCOUNTER FOR BIRTH CONTROL PILLS MAINTENANCE: ICD-10-CM

## 2022-08-08 DIAGNOSIS — Z01.419 WELL WOMAN EXAM: Primary | ICD-10-CM

## 2022-08-08 DIAGNOSIS — Z87.42 HISTORY OF IRREGULAR MENSTRUAL BLEEDING: ICD-10-CM

## 2022-08-08 DIAGNOSIS — Z87.42 HISTORY OF DYSMENORRHEA: ICD-10-CM

## 2022-08-08 PROCEDURE — 99395 PREV VISIT EST AGE 18-39: CPT | Performed by: CLINICAL NURSE SPECIALIST

## 2022-08-08 RX ORDER — NORETHINDRONE ACETATE AND ETHINYL ESTRADIOL 1.5-30(21)
1 KIT ORAL DAILY
Qty: 1 PACKET | Refills: 11 | Status: SHIPPED | OUTPATIENT
Start: 2022-08-08

## 2022-08-08 NOTE — PROGRESS NOTES
[facility]  [address] [address]  Dept: [phone]        DATE OF VISIT:  22        History and Physical    Denny Agrawal    :  2000  CHIEF COMPLAINT:    Chief Complaint   Patient presents with    Annual Exam                    Denny Agrawal is a 25 y.o. female who presents for annual well woman exam. Patient would like refill on birth control for history of irregular painful menses. The patient was seen and examined. Per the patient bowels areregular. She has no voiding complaints. She denies any bloating as well as vaginal discharge. Chaperone for Intimate Exam  Chaperone was offered as part of the rooming process. Patient declined and agrees to continue with exam without a chaperone.   Chaperone: none     _____________________________________________________________________  Past Medical History:   Diagnosis Date    Anxiety 2019    Class 2 obesity due to excess calories without serious comorbidity with body mass index (BMI) of 37.0 to 37.9 in adult 2019    Headache(784.0)     Meniere syndrome     Mild intermittent asthma 2017    Vision loss 3/29/2016                                                                   Past Surgical History:   Procedure Laterality Date    TYMPANOSTOMY TUBE PLACEMENT Bilateral      Family History   Problem Relation Age of Onset    Asthma Sister     Diabetes Maternal Grandmother     High Blood Pressure Maternal Grandmother     Heart Disease Maternal Grandmother     Asthma Maternal Grandmother     Other Maternal Grandmother         GERSON with CPAP    Asthma Brother      Social History     Tobacco Use   Smoking Status Never   Smokeless Tobacco Never     Social History     Substance and Sexual Activity   Alcohol Use No     Current Outpatient Medications   Medication Sig Dispense Refill    norethindrone-ethinyl estradiol-iron (LOESTRIN FE 1.530) 1.5-30 MG-MCG tablet Take 1 tablet by mouth in the morning. 1 packet 11    Vitamin D (CHOLECALCIFEROL) 25 MCG (1000 UT) TABS tablet Take 2 tablets by mouth in the morning. 60 tablet 5    magnesium oxide (MAG-OX) 250 MG TABS tablet Take 1 tablet by mouth every evening 30 tablet 6    Turmeric 500 MG TABS Take 450 mg by mouth daily 30 tablet 6    coenzyme Q10 100 MG CAPS capsule Take 2 capsules by mouth every morning 60 capsule 6    Galcanezumab-gnlm (EMGALITY) 120 MG/ML SOAJ Inject 1 pen every month (Patient taking differently: Inject 1 pen every month-not currently taking d/t insurance) 1 pen 2    topiramate (TOPAMAX) 50 MG tablet Take 50 mg in the morning and 75 mg at night. 75 tablet 3    albuterol sulfate HFA (VENTOLIN HFA) 108 (90 Base) MCG/ACT inhaler Inhale 2 puffs into the lungs every 6 hours as needed for Wheezing . Take two prophylactic puffs 5-15 min prior to exercise. 1 Inhaler 1    meclizine (ANTIVERT) 12.5 MG tablet Take 12.5 mg by mouth as needed      ibuprofen (ADVIL;MOTRIN) 800 MG tablet Take 1 tablet by mouth every 6 hours as needed for Pain 28 tablet 2    montelukast (SINGULAIR) 10 MG tablet Take 1 tablet by mouth daily 30 tablet 3    vitamin C (ASCORBIC ACID) 500 MG tablet Take 1 tablet by mouth daily 30 tablet 5    ondansetron (ZOFRAN) 4 MG tablet TAKE 1 TABLET BY MOUTH DAILY AS NEEDED FOR NAUSEA OR VOMITING 30 tablet 0    Multiple Vitamins-Minerals (CENTROVITE) TABS Take 1 tablet by mouth daily 30 tablet 11     No current facility-administered medications for this visit. Allergies: Allergies   Allergen Reactions    Seasonal Other (See Comments)    Triamterene Itching     Redness         Gynecologic History:  Patient's last menstrual period was 07/05/2022.   Sexually Active: No  STD History:No  Birth Control: Yes pill and working well    OB History   No obstetric history on file.     ______________________________________________________________________    Review of Systems    REVIEW OFSYSTEMS: external genitalia was with a normal appearance. The vaginal vault was normal. There were no cystocele, rectocele, or enterocele appreciated. There was no vaginal discharge. The cervix was without lesions. There was no cervical motion tenderness. The uterus was mobile, midline and regular. The adnexa no fullness, tenderness or masses appreciated. ASSESSMENT: Normal annual well woman exam    25 y.o. Female; Annual   Diagnosis Orders   1. Well woman exam        2. Encounter for birth control pills maintenance  norethindrone-ethinyl estradiol-iron (LOESTRIN FE 1.5/30) 1.5-30 MG-MCG tablet      3. History of dysmenorrhea        4. History of irregular menstrual bleeding                          PLAN:  - Pap collected. Discussed new papsmear guidelines. - Birth control Discussed. - Smoking risk factors Discussed  - Diet and exercise reviewed. - Routine healthmaintenance per patients PCP.  - Return to clinic in 1 year or earlier with questions, problems, concerns. Return for 1 year for Annual and as needed.         Electronically signed by LAURO Browne CNP on 8/8/2022 at 8:59 AM

## 2023-06-19 ENCOUNTER — OFFICE VISIT (OUTPATIENT)
Dept: FAMILY MEDICINE CLINIC | Age: 23
End: 2023-06-19

## 2023-06-19 VITALS
SYSTOLIC BLOOD PRESSURE: 137 MMHG | BODY MASS INDEX: 49.09 KG/M2 | WEIGHT: 260 LBS | OXYGEN SATURATION: 98 % | HEART RATE: 75 BPM | HEIGHT: 61 IN | TEMPERATURE: 97.8 F | DIASTOLIC BLOOD PRESSURE: 90 MMHG

## 2023-06-19 DIAGNOSIS — Z02.1 PHYSICAL EXAM, PRE-EMPLOYMENT: Primary | ICD-10-CM

## 2023-06-19 PROCEDURE — 99999 PR OFFICE/OUTPT VISIT,PROCEDURE ONLY: CPT | Performed by: FAMILY MEDICINE

## 2023-06-19 NOTE — PROGRESS NOTES
[address] [address]  Dept: [phone]  Dept Fax: [phone]    Charles Bolton is a 21 y.o. female who presents today for her medical conditions/complaintsas noted below.   Charles Bolton is c/o of Employment Physical (Going to work at a  )        HPI:     Patient is here for physical for Jenn Rykert  Past medical history: Meniere's, migraines, dysatonomia, asthma, anxiety  Medications: vitamins, coQ10, meclizine, albuterol, migraine meds, topomax, contraceptive pill, singulair  Allergies: seasonal, triamterene  Immunizations: UTD  Vision: wears glasses and contacts, prescription UTD  Family medical history: DM and AAA in grandmother, heart disease on both sides        Past Medical History:   Diagnosis Date    Anxiety 7/17/2019    Class 2 obesity due to excess calories without serious comorbidity with body mass index (BMI) of 37.0 to 37.9 in adult 7/17/2019    Headache(784.0)     Meniere syndrome     Mild intermittent asthma 8/17/2017    Vision loss 3/29/2016    Past medical history reviewed and pertinent positives/negatives in the HPI    Past Surgical History:   Procedure Laterality Date    TYMPANOSTOMY TUBE PLACEMENT Bilateral 8/13       Family History   Problem Relation Age of Onset    Asthma Sister     Diabetes Maternal Grandmother     High Blood Pressure Maternal Grandmother     Heart Disease Maternal Grandmother     Asthma Maternal Grandmother     Other Maternal Grandmother         GERSON with CPAP    Asthma Brother        Social History     Tobacco Use    Smoking status: Never    Smokeless tobacco: Never   Substance Use Topics    Alcohol use: No      Current Outpatient Medications   Medication Sig Dispense Refill    Erenumab-aooe (AIMOVIG) 70 MG/ML SOAJ Inject 70 mg into the skin every 30 days 1 Adjustable Dose Pre-filled Pen Syringe 3    Turmeric 500 MG TABS Take 450 mg

## 2024-05-06 ENCOUNTER — OFFICE VISIT (OUTPATIENT)
Dept: FAMILY MEDICINE CLINIC | Age: 24
End: 2024-05-06
Payer: MEDICAID

## 2024-05-06 VITALS
DIASTOLIC BLOOD PRESSURE: 74 MMHG | BODY MASS INDEX: 49.54 KG/M2 | OXYGEN SATURATION: 98 % | RESPIRATION RATE: 16 BRPM | WEIGHT: 262.2 LBS | HEART RATE: 95 BPM | SYSTOLIC BLOOD PRESSURE: 102 MMHG

## 2024-05-06 DIAGNOSIS — E55.9 VITAMIN D DEFICIENCY: ICD-10-CM

## 2024-05-06 DIAGNOSIS — J45.20 MILD INTERMITTENT ASTHMA WITHOUT COMPLICATION: ICD-10-CM

## 2024-05-06 DIAGNOSIS — R55 NEAR SYNCOPE: ICD-10-CM

## 2024-05-06 DIAGNOSIS — R55 NEAR SYNCOPE: Primary | ICD-10-CM

## 2024-05-06 DIAGNOSIS — Z00.00 ANNUAL PHYSICAL EXAM: Primary | ICD-10-CM

## 2024-05-06 PROCEDURE — 99395 PREV VISIT EST AGE 18-39: CPT | Performed by: FAMILY MEDICINE

## 2024-05-06 RX ORDER — MECLIZINE HCL 12.5 MG/1
12.5 TABLET ORAL AS NEEDED
Status: CANCELLED | OUTPATIENT
Start: 2024-05-06

## 2024-05-06 RX ORDER — ALBUTEROL SULFATE 90 UG/1
2 AEROSOL, METERED RESPIRATORY (INHALATION) EVERY 6 HOURS PRN
Qty: 1 EACH | Refills: 1 | Status: SHIPPED | OUTPATIENT
Start: 2024-05-06

## 2024-05-06 SDOH — ECONOMIC STABILITY: FOOD INSECURITY: WITHIN THE PAST 12 MONTHS, THE FOOD YOU BOUGHT JUST DIDN'T LAST AND YOU DIDN'T HAVE MONEY TO GET MORE.: NEVER TRUE

## 2024-05-06 SDOH — ECONOMIC STABILITY: HOUSING INSECURITY
IN THE LAST 12 MONTHS, WAS THERE A TIME WHEN YOU DID NOT HAVE A STEADY PLACE TO SLEEP OR SLEPT IN A SHELTER (INCLUDING NOW)?: NO

## 2024-05-06 SDOH — ECONOMIC STABILITY: INCOME INSECURITY: HOW HARD IS IT FOR YOU TO PAY FOR THE VERY BASICS LIKE FOOD, HOUSING, MEDICAL CARE, AND HEATING?: NOT HARD AT ALL

## 2024-05-06 SDOH — ECONOMIC STABILITY: FOOD INSECURITY: WITHIN THE PAST 12 MONTHS, YOU WORRIED THAT YOUR FOOD WOULD RUN OUT BEFORE YOU GOT MONEY TO BUY MORE.: NEVER TRUE

## 2024-05-06 ASSESSMENT — PATIENT HEALTH QUESTIONNAIRE - PHQ9
SUM OF ALL RESPONSES TO PHQ QUESTIONS 1-9: 0
SUM OF ALL RESPONSES TO PHQ QUESTIONS 1-9: 0
1. LITTLE INTEREST OR PLEASURE IN DOING THINGS: NOT AT ALL
2. FEELING DOWN, DEPRESSED OR HOPELESS: NOT AT ALL
SUM OF ALL RESPONSES TO PHQ QUESTIONS 1-9: 0
SUM OF ALL RESPONSES TO PHQ QUESTIONS 1-9: 0
SUM OF ALL RESPONSES TO PHQ9 QUESTIONS 1 & 2: 0

## 2024-05-06 ASSESSMENT — ENCOUNTER SYMPTOMS
SHORTNESS OF BREATH: 0
BLOOD IN STOOL: 0
CHEST TIGHTNESS: 0
ABDOMINAL PAIN: 0

## 2024-05-06 NOTE — PROGRESS NOTES
[facility]     Date of Visit:  2024  Patient Name: Liseth Tom   Patient :  2000     CHIEF COMPLAINT:     Liseth Tom is a 24 y.o. female who presents today for an general visit to be evaluated for the following condition(s):  Chief Complaint   Patient presents with    Annual Exam     CPE    Medication Refill     Albuterolol inhaler, antivert    Other    near syncope     In December, got done eating lunch felt really funny states almost passed out , kept her self awake till her friend got there. Episode lasted 3 hours, none since then.       HISTORY OF PRESENT ILLNESS:       HPI   Patient is a 24-year-old obese female who presents for her annual physical exam.  She has a history of vitamin D deficiency and mild intermittent asthma and states she needs a refill of her Ventolin inhaler.  Patient also states that in December of last year she had an episode where she almost passed out after eating.  She states that the episode lasted about 3 hours.  She has had no similar episodes since then.  She denies any fever, chills, chest pain, abdominal pain, shortness of breath.  REVIEW OF SYSTEMS:      Review of Systems   Constitutional:  Negative for chills and fever.   HENT:  Negative for congestion.    Respiratory:  Negative for chest tightness and shortness of breath.    Cardiovascular:  Negative for chest pain.   Gastrointestinal:  Negative for abdominal pain and blood in stool.   Genitourinary:  Negative for dysuria and hematuria.   Skin:  Negative for rash.   Neurological:  Positive for dizziness and light-headedness (Near syncopal episode).   Psychiatric/Behavioral:  Negative for confusion and dysphoric mood.         REVIEWED INFORMATION      Current Outpatient Medications   Medication Sig Dispense Refill    albuterol sulfate HFA (VENTOLIN HFA) 108 (90 Base) MCG/ACT inhaler Inhale 2 puffs into the lungs every 6 hours as needed for Wheezing . Take two prophylactic puffs 5-15 min prior to

## 2024-05-10 ENCOUNTER — HOSPITAL ENCOUNTER (OUTPATIENT)
Age: 24
Setting detail: SPECIMEN
Discharge: HOME | End: 2024-05-10

## 2024-05-10 DIAGNOSIS — E55.9 VITAMIN D DEFICIENCY: ICD-10-CM

## 2024-05-10 DIAGNOSIS — Z00.00 ANNUAL PHYSICAL EXAM: ICD-10-CM

## 2024-05-10 LAB
25(OH)D3 SERPL-MCNC: 8.2 NG/ML (ref 30–100)
ALBUMIN SERPL-MCNC: 3.9 G/DL (ref 3.5–5.2)
ALBUMIN/GLOB SERPL: 1 {RATIO} (ref 1–2.5)
ALP SERPL-CCNC: 78 U/L (ref 35–104)
ALT SERPL-CCNC: 13 U/L (ref 10–35)
ANION GAP SERPL CALCULATED.3IONS-SCNC: 11 MMOL/L (ref 9–16)
AST SERPL-CCNC: 21 U/L (ref 10–35)
BASOPHILS # BLD: 0.04 K/UL (ref 0–0.2)
BASOPHILS NFR BLD: 0 % (ref 0–2)
BILIRUB SERPL-MCNC: 0.4 MG/DL (ref 0–1.2)
BUN SERPL-MCNC: 11 MG/DL (ref 6–20)
CALCIUM SERPL-MCNC: 9.2 MG/DL (ref 8.6–10.4)
CHLORIDE SERPL-SCNC: 102 MMOL/L (ref 98–107)
CHOLEST SERPL-MCNC: 143 MG/DL (ref 0–199)
CHOLESTEROL/HDL RATIO: 3
CO2 SERPL-SCNC: 26 MMOL/L (ref 20–31)
CREAT SERPL-MCNC: 0.7 MG/DL (ref 0.5–0.9)
EOSINOPHIL # BLD: 0.08 K/UL (ref 0–0.44)
EOSINOPHILS RELATIVE PERCENT: 1 % (ref 1–4)
ERYTHROCYTE [DISTWIDTH] IN BLOOD BY AUTOMATED COUNT: 14.2 % (ref 11.8–14.4)
EST. AVERAGE GLUCOSE BLD GHB EST-MCNC: 108 MG/DL
GFR, ESTIMATED: >90 ML/MIN/1.73M2
GLUCOSE SERPL-MCNC: 67 MG/DL (ref 74–99)
HBA1C MFR BLD: 5.4 % (ref 4–6)
HCT VFR BLD AUTO: 41.5 % (ref 36.3–47.1)
HDLC SERPL-MCNC: 44 MG/DL
HGB BLD-MCNC: 12.4 G/DL (ref 11.9–15.1)
IMM GRANULOCYTES # BLD AUTO: 0.04 K/UL (ref 0–0.3)
IMM GRANULOCYTES NFR BLD: 0 %
LDLC SERPL CALC-MCNC: 90 MG/DL (ref 0–100)
LYMPHOCYTES NFR BLD: 4.27 K/UL (ref 1.1–3.7)
LYMPHOCYTES RELATIVE PERCENT: 36 % (ref 24–43)
MAGNESIUM SERPL-MCNC: 2.1 MG/DL (ref 1.6–2.6)
MCH RBC QN AUTO: 25.3 PG (ref 25.2–33.5)
MCHC RBC AUTO-ENTMCNC: 29.9 G/DL (ref 28.4–34.8)
MCV RBC AUTO: 84.7 FL (ref 82.6–102.9)
MONOCYTES NFR BLD: 0.63 K/UL (ref 0.1–1.2)
MONOCYTES NFR BLD: 5 % (ref 3–12)
NEUTROPHILS NFR BLD: 58 % (ref 36–65)
NEUTS SEG NFR BLD: 6.79 K/UL (ref 1.5–8.1)
NRBC BLD-RTO: 0 PER 100 WBC
PLATELET # BLD AUTO: 525 K/UL (ref 138–453)
PMV BLD AUTO: 8.9 FL (ref 8.1–13.5)
POTASSIUM SERPL-SCNC: 4.3 MMOL/L (ref 3.7–5.3)
PROT SERPL-MCNC: 7.7 G/DL (ref 6.6–8.7)
RBC # BLD AUTO: 4.9 M/UL (ref 3.95–5.11)
SODIUM SERPL-SCNC: 139 MMOL/L (ref 136–145)
TRIGL SERPL-MCNC: 41 MG/DL
TSH SERPL DL<=0.05 MIU/L-ACNC: 1.38 UIU/ML (ref 0.27–4.2)
VLDLC SERPL CALC-MCNC: 8 MG/DL
WBC OTHER # BLD: 11.9 K/UL (ref 3.5–11.3)

## 2024-05-13 DIAGNOSIS — D72.820 LYMPHOCYTOSIS: ICD-10-CM

## 2024-05-13 DIAGNOSIS — E55.9 VITAMIN D DEFICIENCY: Primary | ICD-10-CM

## 2024-05-13 DIAGNOSIS — D75.839 THROMBOCYTOSIS: ICD-10-CM

## 2024-05-31 ENCOUNTER — TELEPHONE (OUTPATIENT)
Dept: ONCOLOGY | Age: 24
End: 2024-05-31

## 2024-05-31 ENCOUNTER — HOSPITAL ENCOUNTER (OUTPATIENT)
Age: 24
Setting detail: SPECIMEN
Discharge: HOME | End: 2024-05-31

## 2024-05-31 ENCOUNTER — INITIAL CONSULT (OUTPATIENT)
Dept: ONCOLOGY | Age: 24
End: 2024-05-31
Payer: MEDICAID

## 2024-05-31 VITALS
WEIGHT: 266 LBS | TEMPERATURE: 97 F | BODY MASS INDEX: 50.26 KG/M2 | HEART RATE: 76 BPM | DIASTOLIC BLOOD PRESSURE: 85 MMHG | SYSTOLIC BLOOD PRESSURE: 120 MMHG

## 2024-05-31 DIAGNOSIS — D50.0 IRON DEFICIENCY ANEMIA DUE TO CHRONIC BLOOD LOSS: ICD-10-CM

## 2024-05-31 DIAGNOSIS — N92.1 MENORRHAGIA WITH IRREGULAR CYCLE: ICD-10-CM

## 2024-05-31 DIAGNOSIS — D72.820 LYMPHOCYTOSIS: ICD-10-CM

## 2024-05-31 DIAGNOSIS — D72.820 LYMPHOCYTOSIS: Primary | ICD-10-CM

## 2024-05-31 PROBLEM — D50.9 IRON DEFICIENCY ANEMIA: Status: ACTIVE | Noted: 2024-05-31

## 2024-05-31 PROBLEM — N92.0 MENORRHAGIA: Status: ACTIVE | Noted: 2024-05-31

## 2024-05-31 LAB
BASOPHILS # BLD: 0.03 K/UL (ref 0–0.2)
BASOPHILS NFR BLD: 0 % (ref 0–2)
EOSINOPHIL # BLD: 0.06 K/UL (ref 0–0.44)
EOSINOPHILS RELATIVE PERCENT: 1 % (ref 1–4)
ERYTHROCYTE [DISTWIDTH] IN BLOOD BY AUTOMATED COUNT: 14.1 % (ref 11.8–14.4)
FERRITIN SERPL-MCNC: 112 NG/ML (ref 13–150)
HCT VFR BLD AUTO: 39.8 % (ref 36.3–47.1)
HGB BLD-MCNC: 12.1 G/DL (ref 11.9–15.1)
IMM GRANULOCYTES # BLD AUTO: 0.12 K/UL (ref 0–0.3)
IMM GRANULOCYTES NFR BLD: 1 %
IMM RETICS NFR: 14 % (ref 2.7–18.3)
IRON SATN MFR SERPL: 12 % (ref 20–55)
IRON SERPL-MCNC: 34 UG/DL (ref 37–145)
LYMPHOCYTES NFR BLD: 2.94 K/UL (ref 1.1–3.7)
LYMPHOCYTES RELATIVE PERCENT: 30 % (ref 24–43)
MCH RBC QN AUTO: 25.7 PG (ref 25.2–33.5)
MCHC RBC AUTO-ENTMCNC: 30.4 G/DL (ref 28.4–34.8)
MCV RBC AUTO: 84.5 FL (ref 82.6–102.9)
MONOCYTES NFR BLD: 0.51 K/UL (ref 0.1–1.2)
MONOCYTES NFR BLD: 5 % (ref 3–12)
NEUTROPHILS NFR BLD: 63 % (ref 36–65)
NEUTS SEG NFR BLD: 6.27 K/UL (ref 1.5–8.1)
NRBC BLD-RTO: 0 PER 100 WBC
PLATELET # BLD AUTO: 521 K/UL (ref 138–453)
PMV BLD AUTO: 9.2 FL (ref 8.1–13.5)
RBC # BLD AUTO: 4.71 M/UL (ref 3.95–5.11)
RETIC HEMOGLOBIN: 28.4 PG (ref 28.2–35.7)
RETICS # AUTO: 0.1 M/UL (ref 0.03–0.08)
RETICS/RBC NFR AUTO: 2.1 % (ref 0.5–1.9)
TIBC SERPL-MCNC: 289 UG/DL (ref 250–450)
UNSATURATED IRON BINDING CAPACITY: 255 UG/DL (ref 112–347)
WBC OTHER # BLD: 9.9 K/UL (ref 3.5–11.3)

## 2024-05-31 PROCEDURE — 99245 OFF/OP CONSLTJ NEW/EST HI 55: CPT | Performed by: INTERNAL MEDICINE

## 2024-05-31 NOTE — TELEPHONE ENCOUNTER
AVS 05/31/24    Labs today  Rtc in 4-6 weeks    Labs ordered today  Ferritin  Please complete by 5/31/2024  Flow Cytometry Leukemia/Lymphoma, Blood  Please complete by 5/31/2024  Iron and TIBC  Please complete by 5/31/2024  Path Review, Smear  Please complete by 5/31/2024  CBC with Auto Differential  Please complete by or around 6/7/2024    RV 07/05/24    Labs to be done today    PT was given AVS and appt schedule    Electronically signed by Lila Hauser on 5/31/2024 at 10:16 AM

## 2024-05-31 NOTE — PROGRESS NOTES
hoarseness and voice change   Respiratory: negative for cough , sputum, dyspnea, wheezing, hemoptysis, chest pain   Cardiovascular: negative for chest pain, dyspnea, palpitations, orthopnea, PND   Gastrointestinal: negative for nausea, vomiting, diarrhea, constipation, abdominal pain, Dysphagia, hematemesis and hematochezia   Genitourinary: negative for frequency, dysuria, nocturia, urinary incontinence, and hematuria   Integument: negative for rash, skin lesions, bruises.   Hematologic/Lymphatic: negative for easy bruising, bleeding, lymphadenopathy, petechiae and swelling/edema   Endocrine: negative for heat or cold intolerance, tremor, weight changes, change in bowel habits and hair loss   Musculoskeletal: negative for myalgias, arthralgias, pain, joint swelling,and bone pain   Neurological: negative for headaches, dizziness, seizures, weakness, numbness       OBJECTIVE:         Vitals:    05/31/24 0951   BP: 120/85   Pulse: 76   Temp: 97 °F (36.1 °C)       PHYSICAL EXAM:   General appearance - well appearing, no in pain or distress   Mental status - alert and cooperative   Eyes - pupils equal and reactive, extraocular eye movements intact   Ears - bilateral TM's and external ear canals normal   Mouth - mucous membranes moist, pharynx normal without lesions   Neck - supple, no significant adenopathy   Lymphatics - no palpable lymphadenopathy, no hepatosplenomegaly   Chest - clear to auscultation, no wheezes, rales or rhonchi, symmetric air entry   Heart - normal rate, regular rhythm, normal S1, S2, no murmurs, rubs, clicks or gallops   Abdomen - soft, nontender, nondistended, no masses or organomegaly   Neurological - alert, oriented, normal speech, no focal findings or movement disorder noted   Musculoskeletal - no joint tenderness, deformity or swelling   Extremities - peripheral pulses normal, no pedal edema, no clubbing or cyanosis   Skin - normal coloration and turgor, no rashes, no suspicious skin lesions

## 2024-06-03 ENCOUNTER — HOSPITAL ENCOUNTER (OUTPATIENT)
Age: 24
Setting detail: SPECIMEN
Discharge: HOME | End: 2024-06-03

## 2024-06-03 LAB
PATH REV BLD -IMP: NORMAL
SURGICAL PATHOLOGY REPORT: NORMAL

## 2024-06-04 LAB — SURGICAL PATHOLOGY REPORT: NORMAL

## 2024-06-05 LAB — FLOW CYTOMETRY BL: NORMAL

## 2024-06-25 ENCOUNTER — HOSPITAL ENCOUNTER (OUTPATIENT)
Age: 24
Setting detail: SPECIMEN
Discharge: HOME | End: 2024-06-25

## 2024-06-25 ENCOUNTER — OFFICE VISIT (OUTPATIENT)
Dept: ONCOLOGY | Age: 24
End: 2024-06-25
Payer: MEDICAID

## 2024-06-25 ENCOUNTER — TELEPHONE (OUTPATIENT)
Dept: ONCOLOGY | Age: 24
End: 2024-06-25

## 2024-06-25 VITALS
SYSTOLIC BLOOD PRESSURE: 137 MMHG | DIASTOLIC BLOOD PRESSURE: 88 MMHG | TEMPERATURE: 97.2 F | WEIGHT: 270 LBS | BODY MASS INDEX: 51.02 KG/M2 | HEART RATE: 81 BPM

## 2024-06-25 DIAGNOSIS — D75.839 THROMBOCYTOSIS: ICD-10-CM

## 2024-06-25 DIAGNOSIS — D50.0 IRON DEFICIENCY ANEMIA DUE TO CHRONIC BLOOD LOSS: Primary | ICD-10-CM

## 2024-06-25 DIAGNOSIS — D72.820 LYMPHOCYTOSIS: ICD-10-CM

## 2024-06-25 DIAGNOSIS — D50.0 IRON DEFICIENCY ANEMIA DUE TO CHRONIC BLOOD LOSS: ICD-10-CM

## 2024-06-25 LAB
BASOPHILS # BLD: 0.03 K/UL (ref 0–0.2)
BASOPHILS NFR BLD: 0 % (ref 0–2)
EOSINOPHIL # BLD: 0.08 K/UL (ref 0–0.44)
EOSINOPHILS RELATIVE PERCENT: 1 % (ref 1–4)
ERYTHROCYTE [DISTWIDTH] IN BLOOD BY AUTOMATED COUNT: 13.7 % (ref 11.8–14.4)
FERRITIN SERPL-MCNC: 83 NG/ML (ref 13–150)
FOLATE SERPL-MCNC: 9.2 NG/ML (ref 4.8–24.2)
HCT VFR BLD AUTO: 38.6 % (ref 36.3–47.1)
HGB BLD-MCNC: 12.1 G/DL (ref 11.9–15.1)
IMM GRANULOCYTES # BLD AUTO: <0.03 K/UL (ref 0–0.3)
IMM GRANULOCYTES NFR BLD: 0 %
IRON SATN MFR SERPL: 14 % (ref 20–55)
IRON SERPL-MCNC: 37 UG/DL (ref 37–145)
LYMPHOCYTES NFR BLD: 2.98 K/UL (ref 1.1–3.7)
LYMPHOCYTES RELATIVE PERCENT: 36 % (ref 24–43)
MCH RBC QN AUTO: 25.5 PG (ref 25.2–33.5)
MCHC RBC AUTO-ENTMCNC: 31.3 G/DL (ref 28.4–34.8)
MCV RBC AUTO: 81.3 FL (ref 82.6–102.9)
MONOCYTES NFR BLD: 0.51 K/UL (ref 0.1–1.2)
MONOCYTES NFR BLD: 6 % (ref 3–12)
NEUTROPHILS NFR BLD: 57 % (ref 36–65)
NEUTS SEG NFR BLD: 4.69 K/UL (ref 1.5–8.1)
NRBC BLD-RTO: 0 PER 100 WBC
PLATELET # BLD AUTO: 471 K/UL (ref 138–453)
PMV BLD AUTO: 9 FL (ref 8.1–13.5)
RBC # BLD AUTO: 4.75 M/UL (ref 3.95–5.11)
RBC # BLD: ABNORMAL 10*6/UL
TIBC SERPL-MCNC: 270 UG/DL (ref 250–450)
UNSATURATED IRON BINDING CAPACITY: 233 UG/DL (ref 112–347)
VIT B12 SERPL-MCNC: 459 PG/ML (ref 232–1245)
WBC OTHER # BLD: 8.3 K/UL (ref 3.5–11.3)

## 2024-06-25 PROCEDURE — 99214 OFFICE O/P EST MOD 30 MIN: CPT | Performed by: INTERNAL MEDICINE

## 2024-06-25 PROCEDURE — 99211 OFF/OP EST MAY X REQ PHY/QHP: CPT

## 2024-06-25 RX ORDER — FERROUS SULFATE 325(65) MG
325 TABLET ORAL
Qty: 30 TABLET | Refills: 0 | Status: SHIPPED | OUTPATIENT
Start: 2024-06-25

## 2024-06-25 NOTE — PROGRESS NOTES
THE BRAIN WITHOUT AND WITH CONTRAST; MRA OF THE HEAD WITH AND WITHOUT  CONTRAST 1/28/2021 7:00 am; 1/28/2021 7:10 am:    TECHNIQUE:  Multiplanar multisequence MRI of the head/brain was performed without and  with the administration of intravenous contrast.; Multiplanar multisequence  MRA of the head was performed with and without the administration of  intravenous contrast.  Maximum intensity projection images were reviewed.    COMPARISON:  MRI brain 11/04/2017.    HISTORY:  ORDERING SYSTEM PROVIDED HISTORY: Vertigo  TECHNOLOGIST PROVIDED HISTORY:  Brain lesion; please compare with prior study from 2017  Is the patient pregnant?->No  Reason for Exam: Headaches/migraines with eye pain, nausea, and vomiting  since 2010. History of ear tubes placed; ORDERING SYSTEM PROVIDED HISTORY:  Vertigo  TECHNOLOGIST PROVIDED HISTORY:  DIZZINESS  Is the patient pregnant?->No  Reason for Exam: Headaches/migraines with eye pain, nausea, and vomiting  since 2010. History of ear tubes placed    FINDINGS:    MRI BRAIN:    INTRACRANIAL STRUCTURES/VENTRICLES: There is no acute infarct.  There is no  significant change in the small focus of T2 hyperintensity within the  subcortical white matter of the left parietal lobe.  No associated contrast  enhancement is seen.  No mass effect or midline shift. No evidence of an  acute intracranial hemorrhage.  The ventricles and sulci are normal in size  and configuration.  The sellar/suprasellar regions appear unremarkable.  The  normal signal voids within the major intracranial vessels appear maintained.  No abnormal focus of enhancement is seen within the brain.    ORBITS: The visualized portion of the orbits demonstrate no acute abnormality.    SINUSES: The visualized paranasal sinuses and mastoid air cells are well  aerated.    BONES/SOFT TISSUES: The bone marrow signal intensity appears normal. The soft  tissues demonstrate no acute abnormality.    MRA HEAD:    Motion partially limits

## 2024-06-25 NOTE — TELEPHONE ENCOUNTER
AVS 06/25/24    Rtc in 3 months with labs        Labs ordered today  Ferritin  Please complete by 6/25/2024  Iron and TIBC  Please complete by 6/25/2024  Vitamin B12 & Folate  Please complete by 6/25/2024  CBC with Auto Differential  Please complete by or around 7/2/2024    RV 09/10/24    Labs to be done week prior    PT was given AVS and appt schedule    Electronically signed by Lila Hauser on 6/25/2024 at 9:51 AM

## 2024-09-05 DIAGNOSIS — D50.0 IRON DEFICIENCY ANEMIA DUE TO CHRONIC BLOOD LOSS: Primary | ICD-10-CM

## 2024-09-09 ENCOUNTER — HOSPITAL ENCOUNTER (OUTPATIENT)
Age: 24
Setting detail: SPECIMEN
Discharge: HOME | End: 2024-09-09

## 2024-09-09 DIAGNOSIS — D50.0 IRON DEFICIENCY ANEMIA DUE TO CHRONIC BLOOD LOSS: ICD-10-CM

## 2024-09-09 LAB
BASOPHILS # BLD: 0.05 K/UL (ref 0–0.2)
BASOPHILS NFR BLD: 0 % (ref 0–2)
EOSINOPHIL # BLD: 0.17 K/UL (ref 0–0.44)
EOSINOPHILS RELATIVE PERCENT: 1 % (ref 1–4)
ERYTHROCYTE [DISTWIDTH] IN BLOOD BY AUTOMATED COUNT: 13.8 % (ref 11.8–14.4)
FERRITIN SERPL-MCNC: 110 NG/ML (ref 13–150)
FOLATE SERPL-MCNC: 7.8 NG/ML (ref 4.8–24.2)
HCT VFR BLD AUTO: 42.5 % (ref 36.3–47.1)
HGB BLD-MCNC: 12.9 G/DL (ref 11.9–15.1)
IMM GRANULOCYTES # BLD AUTO: 0.05 K/UL (ref 0–0.3)
IMM GRANULOCYTES NFR BLD: 0 %
IRON SATN MFR SERPL: 12 % (ref 20–55)
IRON SERPL-MCNC: 32 UG/DL (ref 37–145)
LYMPHOCYTES NFR BLD: 4.38 K/UL (ref 1.1–3.7)
LYMPHOCYTES RELATIVE PERCENT: 33 % (ref 24–43)
MCH RBC QN AUTO: 25.4 PG (ref 25.2–33.5)
MCHC RBC AUTO-ENTMCNC: 30.4 G/DL (ref 28.4–34.8)
MCV RBC AUTO: 83.8 FL (ref 82.6–102.9)
MONOCYTES NFR BLD: 0.78 K/UL (ref 0.1–1.2)
MONOCYTES NFR BLD: 6 % (ref 3–12)
NEUTROPHILS NFR BLD: 60 % (ref 36–65)
NEUTS SEG NFR BLD: 7.86 K/UL (ref 1.5–8.1)
NRBC BLD-RTO: 0 PER 100 WBC
PLATELET # BLD AUTO: 531 K/UL (ref 138–453)
PMV BLD AUTO: 9.3 FL (ref 8.1–13.5)
RBC # BLD AUTO: 5.07 M/UL (ref 3.95–5.11)
TIBC SERPL-MCNC: 273 UG/DL (ref 250–450)
UNSATURATED IRON BINDING CAPACITY: 241 UG/DL (ref 112–347)
VIT B12 SERPL-MCNC: 471 PG/ML (ref 232–1245)
WBC OTHER # BLD: 13.3 K/UL (ref 3.5–11.3)

## 2024-09-10 ENCOUNTER — TELEPHONE (OUTPATIENT)
Dept: ONCOLOGY | Age: 24
End: 2024-09-10

## 2024-09-10 ENCOUNTER — OFFICE VISIT (OUTPATIENT)
Dept: ONCOLOGY | Age: 24
End: 2024-09-10
Payer: MEDICAID

## 2024-09-10 VITALS
TEMPERATURE: 97.1 F | SYSTOLIC BLOOD PRESSURE: 128 MMHG | DIASTOLIC BLOOD PRESSURE: 93 MMHG | HEART RATE: 81 BPM | WEIGHT: 265 LBS | BODY MASS INDEX: 50.07 KG/M2

## 2024-09-10 DIAGNOSIS — D75.839 THROMBOCYTOSIS: Primary | ICD-10-CM

## 2024-09-10 DIAGNOSIS — D50.0 IRON DEFICIENCY ANEMIA DUE TO CHRONIC BLOOD LOSS: ICD-10-CM

## 2024-09-10 DIAGNOSIS — D72.820 LYMPHOCYTOSIS: ICD-10-CM

## 2024-09-10 PROCEDURE — 99214 OFFICE O/P EST MOD 30 MIN: CPT | Performed by: INTERNAL MEDICINE

## 2024-09-10 RX ORDER — FERROUS SULFATE 325(65) MG
325 TABLET ORAL 2 TIMES DAILY
Qty: 180 TABLET | Refills: 1 | Status: SHIPPED | OUTPATIENT
Start: 2024-09-10

## 2024-11-04 ENCOUNTER — OFFICE VISIT (OUTPATIENT)
Dept: FAMILY MEDICINE CLINIC | Age: 24
End: 2024-11-04

## 2024-11-04 VITALS
SYSTOLIC BLOOD PRESSURE: 102 MMHG | HEART RATE: 88 BPM | HEIGHT: 61 IN | BODY MASS INDEX: 49.5 KG/M2 | WEIGHT: 262.2 LBS | RESPIRATION RATE: 16 BRPM | OXYGEN SATURATION: 98 % | DIASTOLIC BLOOD PRESSURE: 66 MMHG

## 2024-11-04 DIAGNOSIS — E55.9 VITAMIN D DEFICIENCY: Primary | ICD-10-CM

## 2024-11-04 DIAGNOSIS — Z23 FLU VACCINE NEED: ICD-10-CM

## 2024-11-04 SDOH — ECONOMIC STABILITY: FOOD INSECURITY: WITHIN THE PAST 12 MONTHS, YOU WORRIED THAT YOUR FOOD WOULD RUN OUT BEFORE YOU GOT MONEY TO BUY MORE.: NEVER TRUE

## 2024-11-04 SDOH — ECONOMIC STABILITY: INCOME INSECURITY: HOW HARD IS IT FOR YOU TO PAY FOR THE VERY BASICS LIKE FOOD, HOUSING, MEDICAL CARE, AND HEATING?: NOT HARD AT ALL

## 2024-11-04 SDOH — ECONOMIC STABILITY: FOOD INSECURITY: WITHIN THE PAST 12 MONTHS, THE FOOD YOU BOUGHT JUST DIDN'T LAST AND YOU DIDN'T HAVE MONEY TO GET MORE.: NEVER TRUE

## 2024-11-04 ASSESSMENT — ENCOUNTER SYMPTOMS
SHORTNESS OF BREATH: 0
BLOOD IN STOOL: 0
ABDOMINAL PAIN: 0
CHEST TIGHTNESS: 0

## 2024-11-04 ASSESSMENT — PATIENT HEALTH QUESTIONNAIRE - PHQ9
1. LITTLE INTEREST OR PLEASURE IN DOING THINGS: NOT AT ALL
SUM OF ALL RESPONSES TO PHQ QUESTIONS 1-9: 0
SUM OF ALL RESPONSES TO PHQ9 QUESTIONS 1 & 2: 0
2. FEELING DOWN, DEPRESSED OR HOPELESS: NOT AT ALL
SUM OF ALL RESPONSES TO PHQ QUESTIONS 1-9: 0

## 2024-11-04 NOTE — PROGRESS NOTES
deficiency    - Vitamin D 25 Hydroxy; Future  Continue daily vitamin D3  2. Flu vaccine need    - POCT Influenza A/B & RSV DNA       Orders Placed This Encounter   Procedures    Vitamin D 25 Hydroxy     Standing Status:   Future     Standing Expiration Date:   11/4/2025    POCT Influenza A/B & RSV DNA        No orders of the defined types were placed in this encounter.       No follow-ups on file.    Electronically signed by Kenn Ma MD on 11/4/2024 at 10:07 AM

## 2025-01-09 ENCOUNTER — TELEPHONE (OUTPATIENT)
Dept: ONCOLOGY | Age: 25
End: 2025-01-09

## 2025-01-09 NOTE — TELEPHONE ENCOUNTER
**LVM REMINDER ON APPT 1/14/25**  **LABS COMPLETED, EXCEPT JAK2**     Electronically signed by Lila Hauser on 1/9/2025 at 11:32 AM